# Patient Record
Sex: FEMALE | Race: WHITE | Employment: OTHER | URBAN - NONMETROPOLITAN AREA
[De-identification: names, ages, dates, MRNs, and addresses within clinical notes are randomized per-mention and may not be internally consistent; named-entity substitution may affect disease eponyms.]

---

## 2019-08-05 ENCOUNTER — OFFICE VISIT (OUTPATIENT)
Dept: PRIMARY CARE CLINIC | Age: 79
End: 2019-08-05
Payer: MEDICARE

## 2019-08-05 VITALS
TEMPERATURE: 98.1 F | WEIGHT: 114 LBS | HEART RATE: 51 BPM | DIASTOLIC BLOOD PRESSURE: 68 MMHG | SYSTOLIC BLOOD PRESSURE: 124 MMHG | RESPIRATION RATE: 16 BRPM | BODY MASS INDEX: 22.98 KG/M2 | HEIGHT: 59 IN | OXYGEN SATURATION: 98 %

## 2019-08-05 DIAGNOSIS — E11.65 TYPE 2 DIABETES MELLITUS WITH HYPERGLYCEMIA, WITHOUT LONG-TERM CURRENT USE OF INSULIN (HCC): ICD-10-CM

## 2019-08-05 DIAGNOSIS — I10 ESSENTIAL HYPERTENSION: Primary | ICD-10-CM

## 2019-08-05 DIAGNOSIS — F01.50 VASCULAR DEMENTIA WITHOUT BEHAVIORAL DISTURBANCE (HCC): ICD-10-CM

## 2019-08-05 DIAGNOSIS — E78.2 MIXED HYPERLIPIDEMIA: ICD-10-CM

## 2019-08-05 PROCEDURE — 99214 OFFICE O/P EST MOD 30 MIN: CPT | Performed by: INTERNAL MEDICINE

## 2019-08-05 RX ORDER — ENALAPRIL MALEATE 10 MG/1
2 TABLET ORAL NIGHTLY
Refills: 3 | COMMUNITY
Start: 2019-06-24 | End: 2019-08-05 | Stop reason: SDUPTHER

## 2019-08-05 RX ORDER — PITAVASTATIN CALCIUM 2.09 MG/1
1 TABLET, FILM COATED ORAL NIGHTLY
Qty: 90 TABLET | Refills: 3 | Status: SHIPPED | OUTPATIENT
Start: 2019-08-05 | End: 2019-08-08

## 2019-08-05 RX ORDER — ALLOPURINOL 100 MG/1
2 TABLET ORAL DAILY
Refills: 3 | COMMUNITY
Start: 2019-06-24 | End: 2020-02-04 | Stop reason: SDUPTHER

## 2019-08-05 RX ORDER — DONEPEZIL HYDROCHLORIDE 10 MG/1
10 TABLET, FILM COATED ORAL DAILY
Qty: 90 TABLET | Refills: 3 | Status: SHIPPED
Start: 2019-08-05 | End: 2020-06-04 | Stop reason: SDUPTHER

## 2019-08-05 RX ORDER — HYDROCHLOROTHIAZIDE 25 MG/1
25 TABLET ORAL DAILY
Qty: 90 TABLET | Refills: 3 | Status: SHIPPED
Start: 2019-08-05 | End: 2020-06-04 | Stop reason: SDUPTHER

## 2019-08-05 RX ORDER — DONEPEZIL HYDROCHLORIDE 10 MG/1
1 TABLET, FILM COATED ORAL DAILY
Refills: 3 | COMMUNITY
Start: 2019-05-28 | End: 2019-08-05 | Stop reason: SDUPTHER

## 2019-08-05 RX ORDER — METFORMIN HYDROCHLORIDE 500 MG/1
2 TABLET, EXTENDED RELEASE ORAL 2 TIMES DAILY
Refills: 3 | COMMUNITY
Start: 2019-05-15 | End: 2019-08-05 | Stop reason: SDUPTHER

## 2019-08-05 RX ORDER — METOPROLOL TARTRATE 50 MG/1
75 TABLET, FILM COATED ORAL 2 TIMES DAILY
Qty: 270 TABLET | Refills: 3 | Status: SHIPPED | OUTPATIENT
Start: 2019-08-05 | End: 2019-11-05 | Stop reason: SDUPTHER

## 2019-08-05 RX ORDER — METFORMIN HYDROCHLORIDE 500 MG/1
1000 TABLET, EXTENDED RELEASE ORAL 2 TIMES DAILY
Qty: 360 TABLET | Refills: 3 | Status: SHIPPED | OUTPATIENT
Start: 2019-08-05 | End: 2019-11-05

## 2019-08-05 RX ORDER — ENALAPRIL MALEATE 10 MG/1
20 TABLET ORAL NIGHTLY
Qty: 180 TABLET | Refills: 3 | Status: SHIPPED
Start: 2019-08-05 | End: 2020-05-04 | Stop reason: SDUPTHER

## 2019-08-05 RX ORDER — ACETAMINOPHEN 160 MG
TABLET,DISINTEGRATING ORAL DAILY
COMMUNITY

## 2019-08-05 RX ORDER — HYDROCHLOROTHIAZIDE 25 MG/1
1 TABLET ORAL DAILY
Refills: 3 | COMMUNITY
Start: 2019-05-15 | End: 2019-08-05 | Stop reason: SDUPTHER

## 2019-08-05 RX ORDER — METOPROLOL TARTRATE 50 MG/1
1.5 TABLET, FILM COATED ORAL 2 TIMES DAILY
Refills: 3 | COMMUNITY
Start: 2019-05-15 | End: 2019-08-05 | Stop reason: SDUPTHER

## 2019-08-05 RX ORDER — PITAVASTATIN CALCIUM 2.09 MG/1
1 TABLET, FILM COATED ORAL NIGHTLY
Refills: 3 | COMMUNITY
Start: 2019-05-15 | End: 2019-08-05 | Stop reason: SDUPTHER

## 2019-08-05 SDOH — HEALTH STABILITY: MENTAL HEALTH: HOW OFTEN DO YOU HAVE A DRINK CONTAINING ALCOHOL?: NEVER

## 2019-08-05 ASSESSMENT — ENCOUNTER SYMPTOMS
ANAL BLEEDING: 0
STRIDOR: 0
ABDOMINAL PAIN: 0
VOMITING: 0
EYE DISCHARGE: 0
COLOR CHANGE: 0
RHINORRHEA: 0
EYE PAIN: 0
WHEEZING: 0
COUGH: 0
PHOTOPHOBIA: 0
EYE ITCHING: 0
CONSTIPATION: 0
TROUBLE SWALLOWING: 0
NAUSEA: 0
SORE THROAT: 0
DIARRHEA: 0
BLOOD IN STOOL: 0
FACIAL SWELLING: 0
SHORTNESS OF BREATH: 0

## 2019-08-05 ASSESSMENT — PATIENT HEALTH QUESTIONNAIRE - PHQ9
SUM OF ALL RESPONSES TO PHQ9 QUESTIONS 1 & 2: 0
1. LITTLE INTEREST OR PLEASURE IN DOING THINGS: 0
2. FEELING DOWN, DEPRESSED OR HOPELESS: 0
SUM OF ALL RESPONSES TO PHQ QUESTIONS 1-9: 0
SUM OF ALL RESPONSES TO PHQ QUESTIONS 1-9: 0

## 2019-08-07 ENCOUNTER — HOSPITAL ENCOUNTER (OUTPATIENT)
Age: 79
Discharge: HOME | End: 2019-08-09
Payer: MEDICARE

## 2019-08-07 DIAGNOSIS — I10 ESSENTIAL HYPERTENSION: ICD-10-CM

## 2019-08-07 DIAGNOSIS — E78.2 MIXED HYPERLIPIDEMIA: ICD-10-CM

## 2019-08-07 DIAGNOSIS — E11.65 TYPE 2 DIABETES MELLITUS WITH HYPERGLYCEMIA, WITHOUT LONG-TERM CURRENT USE OF INSULIN (HCC): ICD-10-CM

## 2019-08-07 LAB
ALBUMIN SERPL-MCNC: 4.8 G/DL (ref 3.5–5.2)
ALP BLD-CCNC: 98 U/L (ref 35–104)
ALT SERPL-CCNC: 23 U/L (ref 0–32)
ANION GAP SERPL CALCULATED.3IONS-SCNC: 18 MMOL/L (ref 7–16)
AST SERPL-CCNC: 34 U/L (ref 0–31)
BILIRUB SERPL-MCNC: 0.3 MG/DL (ref 0–1.2)
BUN BLDV-MCNC: 34 MG/DL (ref 8–23)
CALCIUM SERPL-MCNC: 9.9 MG/DL (ref 8.6–10.2)
CHLORIDE BLD-SCNC: 102 MMOL/L (ref 98–107)
CHOLESTEROL, TOTAL: 157 MG/DL (ref 0–199)
CO2: 20 MMOL/L (ref 22–29)
CREAT SERPL-MCNC: 0.9 MG/DL (ref 0.5–1)
CREATININE URINE: 84 MG/DL (ref 29–226)
GFR AFRICAN AMERICAN: >60
GFR NON-AFRICAN AMERICAN: >60 ML/MIN/1.73
GLUCOSE BLD-MCNC: 223 MG/DL (ref 74–99)
HBA1C MFR BLD: 9 % (ref 4–5.6)
HDLC SERPL-MCNC: 40 MG/DL
LDL CHOLESTEROL CALCULATED: 67 MG/DL (ref 0–99)
MICROALBUMIN UR-MCNC: 63.7 MG/L
MICROALBUMIN/CREAT UR-RTO: 75.8 (ref 0–30)
POTASSIUM SERPL-SCNC: 4.9 MMOL/L (ref 3.5–5)
SODIUM BLD-SCNC: 140 MMOL/L (ref 132–146)
TOTAL PROTEIN: 7.6 G/DL (ref 6.4–8.3)
TRIGL SERPL-MCNC: 250 MG/DL (ref 0–149)
VLDLC SERPL CALC-MCNC: 50 MG/DL

## 2019-08-07 PROCEDURE — 80053 COMPREHEN METABOLIC PANEL: CPT

## 2019-08-07 PROCEDURE — 36415 COLL VENOUS BLD VENIPUNCTURE: CPT

## 2019-08-07 PROCEDURE — 83036 HEMOGLOBIN GLYCOSYLATED A1C: CPT

## 2019-08-07 PROCEDURE — 82570 ASSAY OF URINE CREATININE: CPT

## 2019-08-07 PROCEDURE — 80061 LIPID PANEL: CPT

## 2019-08-07 PROCEDURE — 82044 UR ALBUMIN SEMIQUANTITATIVE: CPT

## 2019-08-08 ENCOUNTER — TELEPHONE (OUTPATIENT)
Dept: PRIMARY CARE CLINIC | Age: 79
End: 2019-08-08

## 2019-08-08 DIAGNOSIS — E11.65 TYPE 2 DIABETES MELLITUS WITH HYPERGLYCEMIA, WITHOUT LONG-TERM CURRENT USE OF INSULIN (HCC): Primary | ICD-10-CM

## 2019-08-08 DIAGNOSIS — E78.2 MIXED HYPERLIPIDEMIA: Primary | ICD-10-CM

## 2019-08-08 RX ORDER — GLIMEPIRIDE 2 MG/1
TABLET ORAL
Qty: 60 TABLET | Refills: 5 | Status: SHIPPED | OUTPATIENT
Start: 2019-08-08 | End: 2019-11-05 | Stop reason: SDUPTHER

## 2019-08-08 RX ORDER — SIMVASTATIN 10 MG
10 TABLET ORAL NIGHTLY
Qty: 30 TABLET | Refills: 5 | Status: SHIPPED | OUTPATIENT
Start: 2019-08-08 | End: 2019-09-06 | Stop reason: SDUPTHER

## 2019-09-06 DIAGNOSIS — E78.2 MIXED HYPERLIPIDEMIA: ICD-10-CM

## 2019-09-06 RX ORDER — SIMVASTATIN 10 MG
10 TABLET ORAL NIGHTLY
Qty: 30 TABLET | Refills: 5 | Status: SHIPPED | OUTPATIENT
Start: 2019-09-06 | End: 2019-09-08 | Stop reason: SDUPTHER

## 2019-09-06 NOTE — TELEPHONE ENCOUNTER
Patient asking for a refill since pharmacy never received previous script.    Simvastatin sent to you

## 2019-09-08 RX ORDER — SIMVASTATIN 10 MG
10 TABLET ORAL NIGHTLY
Qty: 30 TABLET | Refills: 5 | Status: SHIPPED | OUTPATIENT
Start: 2019-09-08 | End: 2019-11-05 | Stop reason: SDUPTHER

## 2019-11-04 ENCOUNTER — TELEPHONE (OUTPATIENT)
Dept: FAMILY MEDICINE CLINIC | Age: 79
End: 2019-11-04

## 2019-11-05 ENCOUNTER — OFFICE VISIT (OUTPATIENT)
Dept: PRIMARY CARE CLINIC | Age: 79
End: 2019-11-05
Payer: MEDICARE

## 2019-11-05 VITALS
HEART RATE: 65 BPM | SYSTOLIC BLOOD PRESSURE: 132 MMHG | OXYGEN SATURATION: 98 % | HEIGHT: 59 IN | BODY MASS INDEX: 22.58 KG/M2 | WEIGHT: 112 LBS | RESPIRATION RATE: 16 BRPM | TEMPERATURE: 98 F | DIASTOLIC BLOOD PRESSURE: 82 MMHG

## 2019-11-05 DIAGNOSIS — E78.2 MIXED HYPERLIPIDEMIA: ICD-10-CM

## 2019-11-05 DIAGNOSIS — F01.50 VASCULAR DEMENTIA WITHOUT BEHAVIORAL DISTURBANCE (HCC): ICD-10-CM

## 2019-11-05 DIAGNOSIS — I10 ESSENTIAL HYPERTENSION: ICD-10-CM

## 2019-11-05 DIAGNOSIS — Z12.39 SCREENING FOR BREAST CANCER: ICD-10-CM

## 2019-11-05 DIAGNOSIS — E11.65 TYPE 2 DIABETES MELLITUS WITH HYPERGLYCEMIA, WITHOUT LONG-TERM CURRENT USE OF INSULIN (HCC): Primary | ICD-10-CM

## 2019-11-05 DIAGNOSIS — Z12.31 ENCOUNTER FOR SCREENING MAMMOGRAM FOR MALIGNANT NEOPLASM OF BREAST: ICD-10-CM

## 2019-11-05 LAB — HBA1C MFR BLD: 6.4 %

## 2019-11-05 PROCEDURE — 83036 HEMOGLOBIN GLYCOSYLATED A1C: CPT | Performed by: INTERNAL MEDICINE

## 2019-11-05 PROCEDURE — 1123F ACP DISCUSS/DSCN MKR DOCD: CPT | Performed by: INTERNAL MEDICINE

## 2019-11-05 PROCEDURE — 99213 OFFICE O/P EST LOW 20 MIN: CPT | Performed by: INTERNAL MEDICINE

## 2019-11-05 PROCEDURE — 1090F PRES/ABSN URINE INCON ASSESS: CPT | Performed by: INTERNAL MEDICINE

## 2019-11-05 PROCEDURE — G8427 DOCREV CUR MEDS BY ELIG CLIN: HCPCS | Performed by: INTERNAL MEDICINE

## 2019-11-05 PROCEDURE — G8420 CALC BMI NORM PARAMETERS: HCPCS | Performed by: INTERNAL MEDICINE

## 2019-11-05 PROCEDURE — 4040F PNEUMOC VAC/ADMIN/RCVD: CPT | Performed by: INTERNAL MEDICINE

## 2019-11-05 PROCEDURE — 1036F TOBACCO NON-USER: CPT | Performed by: INTERNAL MEDICINE

## 2019-11-05 PROCEDURE — G8399 PT W/DXA RESULTS DOCUMENT: HCPCS | Performed by: INTERNAL MEDICINE

## 2019-11-05 PROCEDURE — G8484 FLU IMMUNIZE NO ADMIN: HCPCS | Performed by: INTERNAL MEDICINE

## 2019-11-05 RX ORDER — METOPROLOL TARTRATE 50 MG/1
75 TABLET, FILM COATED ORAL 2 TIMES DAILY
Qty: 270 TABLET | Refills: 3 | Status: SHIPPED
Start: 2019-11-05 | End: 2020-06-04 | Stop reason: SDUPTHER

## 2019-11-05 RX ORDER — UBIDECARENONE 100 MG
100 CAPSULE ORAL DAILY
COMMUNITY

## 2019-11-05 RX ORDER — METFORMIN HYDROCHLORIDE 500 MG/1
1000 TABLET, FILM COATED, EXTENDED RELEASE ORAL
COMMUNITY
End: 2020-05-04 | Stop reason: SDUPTHER

## 2019-11-05 RX ORDER — SIMVASTATIN 10 MG
10 TABLET ORAL NIGHTLY
Qty: 90 TABLET | Refills: 3 | Status: SHIPPED | OUTPATIENT
Start: 2019-11-05 | End: 2020-02-04 | Stop reason: SDUPTHER

## 2019-11-05 RX ORDER — GLIMEPIRIDE 2 MG/1
TABLET ORAL
Qty: 180 TABLET | Refills: 3 | Status: SHIPPED | OUTPATIENT
Start: 2019-11-05 | End: 2020-02-04 | Stop reason: SDUPTHER

## 2019-11-05 ASSESSMENT — ENCOUNTER SYMPTOMS
EYE ITCHING: 0
EYE PAIN: 0
PHOTOPHOBIA: 0
SHORTNESS OF BREATH: 0
TROUBLE SWALLOWING: 0
COUGH: 0
COLOR CHANGE: 0
WHEEZING: 0
VOMITING: 0
EYE DISCHARGE: 0
ABDOMINAL PAIN: 0
ANAL BLEEDING: 0
BLOOD IN STOOL: 0
DIARRHEA: 0
NAUSEA: 0
RHINORRHEA: 0
STRIDOR: 0
CONSTIPATION: 0
SORE THROAT: 0
FACIAL SWELLING: 0

## 2019-11-21 DIAGNOSIS — Z12.31 ENCOUNTER FOR SCREENING MAMMOGRAM FOR MALIGNANT NEOPLASM OF BREAST: ICD-10-CM

## 2019-12-05 PROBLEM — Z12.31 ENCOUNTER FOR SCREENING MAMMOGRAM FOR MALIGNANT NEOPLASM OF BREAST: Status: RESOLVED | Noted: 2019-11-05 | Resolved: 2019-12-05

## 2020-01-30 ASSESSMENT — ENCOUNTER SYMPTOMS
COUGH: 0
NAUSEA: 0
SHORTNESS OF BREATH: 0
PHOTOPHOBIA: 0
DIARRHEA: 0
WHEEZING: 0
STRIDOR: 0
TROUBLE SWALLOWING: 0
COLOR CHANGE: 0
ABDOMINAL PAIN: 0
EYE ITCHING: 0
CONSTIPATION: 0
EYE DISCHARGE: 0
SORE THROAT: 0
ANAL BLEEDING: 0
BLOOD IN STOOL: 0
EYE PAIN: 0
VOMITING: 0
FACIAL SWELLING: 0
RHINORRHEA: 0

## 2020-01-30 NOTE — PROGRESS NOTES
2020    Name: Nadja Merino : 1940 Sex: female  Age: 67 y.o. Subjective:  Chief Complaint   Patient presents with    Diabetes    Hypertension          This 67y.o. -year-old female  presents today for evaluation and management of her  chronic medical problems. Current medication list reviewed. The patient is tolerating all medications well without adverse events or known side effects. The patient does understand the risk and benefits of the prescribed medications. Hypertension   Pertinent negatives include no chest pain, headaches, palpitations or shortness of breath. Hyperlipidemia   Pertinent negatives include no chest pain, myalgias or shortness of breath. Patient has a history of type 2 diabetes mellitus, hypertension, hyperlipidemia, mild dementia, gout and osteopenia. She sees Dr. Ethan Montano on a regular basis for treatment of her dementia. Recent bone density scan showed a T score of -1.5. She had a colonoscopy 2 years ago with Dr. Ovidio Andrew and was told she would have it every 5 years    . Mammogram done in 2019 was negative for malignancy. .      Eye examination was done in May 2019 by Dr. Martin Crews and she will be seeing him again. We will get results of her most recent diabetic eye examination. Last hemoglobin A1c has improved. It was 9 before ,now at 6.4%. Reviewed medications with patient and her . She takes metformin 500 mg 2 at suppertime. She takes glimepiride 2 mg before breakfast and supper. Her blood sugars have been low in the morning and I told her  not to give her glimepiride if her blood sugars less than 100 in the morning. He will continue with the evening medications for now. She is also on Vasotec and metoprolol for her blood pressure. She is on donepezil from Dr. Ethan Montano for her memory problems. Takes HydroDIURIL 25 mg daily and simvastatin 10 mg at bedtime. She takes allopurinol 100 mg daily.     She had her flu shot at [facility] this year. Review of Systems   Constitutional: Negative for appetite change, fatigue and unexpected weight change. HENT: Negative for congestion, ear pain, facial swelling, rhinorrhea, sore throat, tinnitus and trouble swallowing. Eyes: Negative for photophobia, pain, discharge, itching and visual disturbance. Respiratory: Negative for cough, shortness of breath, wheezing and stridor. Cardiovascular: Negative for chest pain, palpitations and leg swelling. Gastrointestinal: Negative for abdominal pain, anal bleeding, blood in stool, constipation, diarrhea, nausea and vomiting. Endocrine: Negative for cold intolerance, heat intolerance, polydipsia, polyphagia and polyuria. Genitourinary: Negative for difficulty urinating, dysuria, flank pain, frequency, hematuria and urgency. Musculoskeletal: Negative for arthralgias, gait problem, joint swelling and myalgias. Skin: Negative for color change, pallor and rash. Allergic/Immunologic: Negative for environmental allergies and food allergies. Neurological: Negative for dizziness, tremors, seizures, syncope, speech difficulty, weakness, light-headedness, numbness and headaches. Hematological: Negative for adenopathy. Does not bruise/bleed easily. Psychiatric/Behavioral: Positive for confusion. Negative for agitation, behavioral problems, sleep disturbance and suicidal ideas. The patient is not nervous/anxious.          Memory problems          Current Outpatient Medications:     simvastatin (ZOCOR) 10 MG tablet, Take 1 tablet by mouth nightly, Disp: 90 tablet, Rfl: 3    glimepiride (AMARYL) 2 MG tablet, Take 1 tablet before breakfast and supper, Disp: 180 tablet, Rfl: 3    allopurinol (ZYLOPRIM) 100 MG tablet, Take 2 tablets by mouth daily, Disp: 30 tablet, Rfl: 3    Tetanus-Diphth-Acell Pertussis (BOOSTRIX) 5-2.5-18.5 LF-MCG/0.5 injection, Inject 0.5 mLs into the muscle once for 1 dose, Disp: 1 each, Rfl: 0    coenzyme Q10 100 MG CAPS capsule, Take 100 mg by mouth daily, Disp: , Rfl:     metFORMIN, MOD, (GLUMETZA) 500 MG extended release tablet, Take 1,000 mg by mouth daily (with breakfast), Disp: , Rfl:     metoprolol tartrate (LOPRESSOR) 50 MG tablet, Take 1.5 tablets by mouth 2 times daily, Disp: 270 tablet, Rfl: 3    Cholecalciferol (VITAMIN D3) 50 MCG ( [place]) CAPS, Take by mouth daily , Disp: , Rfl:     hydrochlorothiazide (HYDRODIURIL) 25 MG tablet, Take 1 tablet by mouth daily, Disp: 90 tablet, Rfl: 3    donepezil (ARICEPT) 10 MG tablet, Take 1 tablet by mouth daily, Disp: 90 tablet, Rfl: 3    enalapril (VASOTEC) 10 MG tablet, Take 2 tablets by mouth nightly, Disp: 180 tablet, Rfl: 3     No Known Allergies     Past Medical History:   Diagnosis Date    Essential hypertension 2017    Mixed hyperlipidemia 2019    Type 2 diabetes mellitus with hyperglycemia, without long-term current use of insulin ([facility] 2019    Vascular dementia without behavioral disturbance ([facility] 2019       Health Maintenance Due   Topic Date Due    DTaP/Tdap/Td vaccine (1 - Tdap) 1952        Patient Active Problem List   Diagnosis    Essential hypertension    Type 2 diabetes mellitus with hyperglycemia, without long-term current use of insulin (HCC)    Mixed hyperlipidemia    Vascular dementia without behavioral disturbance (HCC)    Chronic gout without tophus        Past Surgical History:   Procedure Laterality Date     SECTION      DILATION AND CURETTAGE OF UTERUS      TONSILLECTOMY          Family History   Problem Relation Age of Onset    Heart Attack Father         Social History     Tobacco Use    Smoking status: Never Smoker    Smokeless tobacco: Never Used   Substance Use Topics    Alcohol use: Not Currently     Frequency: Never    Drug use: Never        Objective  Vitals:    20 0923   BP: 136/62   Site: Right Upper Arm   Position: Sitting   Cuff Size: Medium Adult   Pulse: 92   Temp: reviewed. ASSESSMENT & PLAN :   Problem List        Circulatory    Essential hypertension - Primary     Blood pressures are stable. Continue medications and monitor blood pressures at home. Call office if systolics are over 306 over diastolics over 90. Check fasting CMP         Relevant Medications    hydrochlorothiazide (HYDRODIURIL) 25 MG tablet    enalapril (VASOTEC) 10 MG tablet    metoprolol tartrate (LOPRESSOR) 50 MG tablet    simvastatin (ZOCOR) 10 MG tablet    Other Relevant Orders    Comprehensive Metabolic Panel       Endocrine    Type 2 diabetes mellitus with hyperglycemia, without long-term current use of insulin ([address]     Watch her diet. Monitor her blood sugars as directed. Let me know if her blood sugars are consistently elevated over 120 fasting. Check hemoglobin A1c and urine microalbumin/creatinine ratio         Relevant Medications    metFORMIN, MOD, (GLUMETZA) 500 MG extended release tablet    glimepiride (AMARYL) 2 MG tablet    Other Relevant Orders    Comprehensive Metabolic Panel    Hemoglobin A1C    Microalbumin / Creatinine Urine Ratio       Nervous and Auditory    Vascular dementia without behavioral disturbance (HCC)     Continue donepezil and follow-up with Dr. Alyssa Can         Relevant Medications    donepezil (ARICEPT) 10 MG tablet       Other    Mixed hyperlipidemia     Watch saturated fats in diet and will monitor lipids         Relevant Medications    hydrochlorothiazide (HYDRODIURIL) 25 MG tablet    enalapril (VASOTEC) 10 MG tablet    metoprolol tartrate (LOPRESSOR) 50 MG tablet    simvastatin (ZOCOR) 10 MG tablet    Other Relevant Orders    Lipid Panel    Comprehensive Metabolic Panel    Chronic gout without tophus     Continue allopurinol and check uric acid         Relevant Medications    allopurinol (ZYLOPRIM) 100 MG tablet           Return in about 3 months (around 5/4/2020) for AWV and regular office visit.        Fernando Eisenberg DO  2/4/2020

## 2020-02-04 ENCOUNTER — TELEPHONE (OUTPATIENT)
Dept: PRIMARY CARE CLINIC | Age: 80
End: 2020-02-04

## 2020-02-04 ENCOUNTER — OFFICE VISIT (OUTPATIENT)
Dept: PRIMARY CARE CLINIC | Age: 80
End: 2020-02-04
Payer: MEDICARE

## 2020-02-04 ENCOUNTER — HOSPITAL ENCOUNTER (OUTPATIENT)
Age: 80
Discharge: HOME | End: 2020-02-06
Payer: MEDICARE

## 2020-02-04 VITALS
WEIGHT: 115 LBS | TEMPERATURE: 97.4 F | DIASTOLIC BLOOD PRESSURE: 62 MMHG | HEIGHT: 58 IN | OXYGEN SATURATION: 93 % | HEART RATE: 92 BPM | BODY MASS INDEX: 24.14 KG/M2 | SYSTOLIC BLOOD PRESSURE: 136 MMHG

## 2020-02-04 PROBLEM — M1A.9XX0 CHRONIC GOUT WITHOUT TOPHUS: Status: ACTIVE | Noted: 2020-02-04

## 2020-02-04 LAB
ALBUMIN SERPL-MCNC: 4.6 G/DL (ref 3.5–5.2)
ALP BLD-CCNC: 95 U/L (ref 35–104)
ALT SERPL-CCNC: 24 U/L (ref 0–32)
ANION GAP SERPL CALCULATED.3IONS-SCNC: 16 MMOL/L (ref 7–16)
AST SERPL-CCNC: 37 U/L (ref 0–31)
BILIRUB SERPL-MCNC: <0.2 MG/DL (ref 0–1.2)
BUN BLDV-MCNC: 32 MG/DL (ref 8–23)
CALCIUM SERPL-MCNC: 10 MG/DL (ref 8.6–10.2)
CHLORIDE BLD-SCNC: 107 MMOL/L (ref 98–107)
CHOLESTEROL, TOTAL: 176 MG/DL (ref 0–199)
CO2: 17 MMOL/L (ref 22–29)
CREAT SERPL-MCNC: 0.9 MG/DL (ref 0.5–1)
CREATININE URINE: 65 MG/DL (ref 29–226)
GFR AFRICAN AMERICAN: >60
GFR NON-AFRICAN AMERICAN: >60 ML/MIN/1.73
GLUCOSE BLD-MCNC: 113 MG/DL (ref 74–99)
HBA1C MFR BLD: 6.7 % (ref 4–5.6)
HDLC SERPL-MCNC: 44 MG/DL
LDL CHOLESTEROL CALCULATED: 84 MG/DL (ref 0–99)
MICROALBUMIN UR-MCNC: 42.2 MG/L
MICROALBUMIN/CREAT UR-RTO: 64.9 (ref 0–30)
POTASSIUM SERPL-SCNC: 5 MMOL/L (ref 3.5–5)
SODIUM BLD-SCNC: 140 MMOL/L (ref 132–146)
TOTAL PROTEIN: 8 G/DL (ref 6.4–8.3)
TRIGL SERPL-MCNC: 239 MG/DL (ref 0–149)
VLDLC SERPL CALC-MCNC: 48 MG/DL

## 2020-02-04 PROCEDURE — G8420 CALC BMI NORM PARAMETERS: HCPCS | Performed by: INTERNAL MEDICINE

## 2020-02-04 PROCEDURE — G8399 PT W/DXA RESULTS DOCUMENT: HCPCS | Performed by: INTERNAL MEDICINE

## 2020-02-04 PROCEDURE — 99214 OFFICE O/P EST MOD 30 MIN: CPT | Performed by: INTERNAL MEDICINE

## 2020-02-04 PROCEDURE — 1090F PRES/ABSN URINE INCON ASSESS: CPT | Performed by: INTERNAL MEDICINE

## 2020-02-04 PROCEDURE — 83036 HEMOGLOBIN GLYCOSYLATED A1C: CPT

## 2020-02-04 PROCEDURE — G8427 DOCREV CUR MEDS BY ELIG CLIN: HCPCS | Performed by: INTERNAL MEDICINE

## 2020-02-04 PROCEDURE — 84550 ASSAY OF BLOOD/URIC ACID: CPT

## 2020-02-04 PROCEDURE — 1123F ACP DISCUSS/DSCN MKR DOCD: CPT | Performed by: INTERNAL MEDICINE

## 2020-02-04 PROCEDURE — 82570 ASSAY OF URINE CREATININE: CPT

## 2020-02-04 PROCEDURE — 36415 COLL VENOUS BLD VENIPUNCTURE: CPT

## 2020-02-04 PROCEDURE — 80053 COMPREHEN METABOLIC PANEL: CPT

## 2020-02-04 PROCEDURE — 82044 UR ALBUMIN SEMIQUANTITATIVE: CPT

## 2020-02-04 PROCEDURE — 1036F TOBACCO NON-USER: CPT | Performed by: INTERNAL MEDICINE

## 2020-02-04 PROCEDURE — 4040F PNEUMOC VAC/ADMIN/RCVD: CPT | Performed by: INTERNAL MEDICINE

## 2020-02-04 PROCEDURE — 80061 LIPID PANEL: CPT

## 2020-02-04 PROCEDURE — G8482 FLU IMMUNIZE ORDER/ADMIN: HCPCS | Performed by: INTERNAL MEDICINE

## 2020-02-04 RX ORDER — GLIMEPIRIDE 2 MG/1
TABLET ORAL
Qty: 180 TABLET | Refills: 3 | Status: SHIPPED
Start: 2020-02-04 | End: 2020-06-16

## 2020-02-04 RX ORDER — SIMVASTATIN 10 MG
10 TABLET ORAL NIGHTLY
Qty: 90 TABLET | Refills: 3 | Status: SHIPPED | OUTPATIENT
Start: 2020-02-04

## 2020-02-04 RX ORDER — ALLOPURINOL 100 MG/1
200 TABLET ORAL DAILY
Qty: 30 TABLET | Refills: 3 | Status: SHIPPED
Start: 2020-02-04 | End: 2020-05-04 | Stop reason: SDUPTHER

## 2020-02-04 ASSESSMENT — PATIENT HEALTH QUESTIONNAIRE - PHQ9
2. FEELING DOWN, DEPRESSED OR HOPELESS: 0
SUM OF ALL RESPONSES TO PHQ QUESTIONS 1-9: 0
SUM OF ALL RESPONSES TO PHQ QUESTIONS 1-9: 0
SUM OF ALL RESPONSES TO PHQ9 QUESTIONS 1 & 2: 0
1. LITTLE INTEREST OR PLEASURE IN DOING THINGS: 0

## 2020-02-05 LAB — URIC ACID, SERUM: 4.1 MG/DL (ref 2.4–5.7)

## 2020-05-04 ENCOUNTER — TELEPHONE (OUTPATIENT)
Dept: PRIMARY CARE CLINIC | Age: 80
End: 2020-05-04

## 2020-05-04 RX ORDER — ENALAPRIL MALEATE 10 MG/1
20 TABLET ORAL NIGHTLY
Qty: 180 TABLET | Refills: 3 | Status: SHIPPED
Start: 2020-05-04 | End: 2020-06-16

## 2020-05-04 RX ORDER — METFORMIN HYDROCHLORIDE 500 MG/1
1000 TABLET, FILM COATED, EXTENDED RELEASE ORAL
Qty: 30 TABLET | Refills: 3 | Status: SHIPPED
Start: 2020-05-04 | End: 2020-05-05

## 2020-05-04 RX ORDER — ALLOPURINOL 100 MG/1
200 TABLET ORAL DAILY
Qty: 30 TABLET | Refills: 3 | Status: SHIPPED
Start: 2020-05-04 | End: 2020-06-16

## 2020-05-04 NOTE — TELEPHONE ENCOUNTER
Alicia from [facility] called regarding script for Metformin send over today. Order is for Metformin (Glumetza) 500 mg extended release tabs. Also order is for 2 tabs PO with breakfast.  Extended release is not usually ordered like this. Please advise.     Electronically signed by Janak Griffith LPN on 1/8/4648 at 3:87 PM

## 2020-05-04 NOTE — TELEPHONE ENCOUNTER
Patient needs pended med refilled. Patient's  called for refills and to cancel 05/06/2020 appointment for both him and Ksenia. They are concerned to leave home due to virus. Rescheduled for June 22.       Electronically signed by Davon Josue LPN on 2/9/2200 at 26:33 AM

## 2020-05-05 RX ORDER — METFORMIN HYDROCHLORIDE 500 MG/1
1000 TABLET, FILM COATED, EXTENDED RELEASE ORAL
COMMUNITY
End: 2020-05-05 | Stop reason: CLARIF

## 2020-05-05 RX ORDER — METFORMIN HYDROCHLORIDE 500 MG/1
1000 TABLET, FILM COATED, EXTENDED RELEASE ORAL
Qty: 60 TABLET | Refills: 2 | Status: SHIPPED
Start: 2020-05-05 | End: 2020-05-05

## 2020-05-05 RX ORDER — METFORMIN HYDROCHLORIDE 500 MG/1
1000 TABLET, FILM COATED, EXTENDED RELEASE ORAL
COMMUNITY
End: 2020-05-05 | Stop reason: SDUPTHER

## 2020-05-05 RX ORDER — METFORMIN HYDROCHLORIDE 500 MG/1
TABLET, EXTENDED RELEASE ORAL
Qty: 180 TABLET | Refills: 1 | Status: SHIPPED
Start: 2020-05-05 | End: 2020-06-16

## 2020-05-05 NOTE — TELEPHONE ENCOUNTER
Pharmacy called stating metformin ([employer]) is not covered but metformin (glucophage) is.  Pended new order for you

## 2020-05-05 NOTE — TELEPHONE ENCOUNTER
I spoke with , Zhao Fontaine, he does not want to make changes to patient's dosage. He wants to continue to give patient two of the 500 mg tabs daily.   I pended order to go over to [facility] per patient's 's request.      Electronically signed by Oh Olmstead LPN on 8/9/8519 at 33:00 PM

## 2020-06-03 ENCOUNTER — TELEPHONE (OUTPATIENT)
Dept: PRIMARY CARE CLINIC | Age: 80
End: 2020-06-03

## 2020-06-04 ENCOUNTER — VIRTUAL VISIT (OUTPATIENT)
Dept: PRIMARY CARE CLINIC | Age: 80
End: 2020-06-04
Payer: MEDICARE

## 2020-06-04 VITALS — HEIGHT: 59 IN | WEIGHT: 115 LBS | BODY MASS INDEX: 23.18 KG/M2

## 2020-06-04 PROBLEM — E55.9 VITAMIN D DEFICIENCY: Status: ACTIVE | Noted: 2020-06-04

## 2020-06-04 PROBLEM — R53.83 OTHER FATIGUE: Status: ACTIVE | Noted: 2020-06-04

## 2020-06-04 PROBLEM — R05.9 COUGH: Status: ACTIVE | Noted: 2020-06-04

## 2020-06-04 PROBLEM — R63.0 LOSS OF APPETITE: Status: ACTIVE | Noted: 2020-06-04

## 2020-06-04 PROCEDURE — 99213 OFFICE O/P EST LOW 20 MIN: CPT | Performed by: INTERNAL MEDICINE

## 2020-06-04 RX ORDER — DONEPEZIL HYDROCHLORIDE 10 MG/1
10 TABLET, FILM COATED ORAL DAILY
Qty: 90 TABLET | Refills: 3 | Status: SHIPPED
Start: 2020-06-04 | End: 2020-06-16

## 2020-06-04 RX ORDER — METOPROLOL TARTRATE 50 MG/1
75 TABLET, FILM COATED ORAL 2 TIMES DAILY
Qty: 270 TABLET | Refills: 3 | Status: SHIPPED | OUTPATIENT
Start: 2020-06-04

## 2020-06-04 RX ORDER — MIRTAZAPINE 7.5 MG/1
7.5 TABLET, FILM COATED ORAL NIGHTLY
Qty: 30 TABLET | Refills: 5 | Status: SHIPPED
Start: 2020-06-04 | End: 2020-08-25 | Stop reason: ALTCHOICE

## 2020-06-04 RX ORDER — HYDROCHLOROTHIAZIDE 25 MG/1
25 TABLET ORAL DAILY
Qty: 90 TABLET | Refills: 3 | Status: SHIPPED
Start: 2020-06-04 | End: 2020-06-16

## 2020-06-04 ASSESSMENT — ENCOUNTER SYMPTOMS
SHORTNESS OF BREATH: 0
DIARRHEA: 0
EYE PAIN: 0
CONSTIPATION: 0
ANAL BLEEDING: 0
FACIAL SWELLING: 0
WHEEZING: 0
PHOTOPHOBIA: 0
BLOOD IN STOOL: 0
COLOR CHANGE: 0
COUGH: 1
ABDOMINAL PAIN: 0
EYE ITCHING: 0
VOMITING: 0
SORE THROAT: 0
NAUSEA: 0
TROUBLE SWALLOWING: 0
EYE DISCHARGE: 0
STRIDOR: 0
RHINORRHEA: 0

## 2020-06-04 NOTE — PROGRESS NOTES
[facility]) 2019       Health Maintenance Due   Topic Date Due    DTaP/Tdap/Td vaccine (1 - Tdap) 1960    Annual Wellness Visit (AWV)  2019        Patient Active Problem List   Diagnosis    Essential hypertension    Type 2 diabetes mellitus with hyperglycemia, without long-term current use of insulin (HCC)    Mixed hyperlipidemia    Vascular dementia without behavioral disturbance (HCC)    Chronic gout without tophus    Cough    Loss of appetite    Other fatigue    Vitamin D deficiency        Past Surgical History:   Procedure Laterality Date     SECTION      DILATION AND CURETTAGE OF UTERUS      TONSILLECTOMY          Family History   Problem Relation Age of Onset    Heart Attack Father         Social History     Tobacco Use    Smoking status: Never Smoker    Smokeless tobacco: Never Used   Substance Use Topics    Alcohol use: Not Currently     Frequency: Never    Drug use: Never        Objective  Vitals:    20 0914   Weight: 115 lb (52.2 kg)   Height: 4' 10.5\" (1.486 m)        Exam:  Physical Exam  Vitals signs reviewed. Constitutional:       Appearance: She is well-developed. HENT:      Head: Normocephalic. Right Ear: External ear normal.      Left Ear: External ear normal.      Nose: Nose normal.      Mouth/Throat:      Pharynx: No oropharyngeal exudate. Eyes:      General: No scleral icterus. Right eye: No discharge. Left eye: No discharge. Conjunctiva/sclera: Conjunctivae normal.      Pupils: Pupils are equal, round, and reactive to light. Neck:      Musculoskeletal: Normal range of motion and neck supple. Thyroid: No thyromegaly. Cardiovascular:      Rate and Rhythm: Normal rate and regular rhythm. Heart sounds: Normal heart sounds. No murmur. No friction rub. No gallop. Pulmonary:      Effort: Pulmonary effort is normal. No respiratory distress. Breath sounds: Normal breath sounds. No wheezing or rales.    Chest: Chest wall: No tenderness. Abdominal:      General: Bowel sounds are normal. There is no distension. Palpations: Abdomen is soft. There is no mass. Tenderness: There is no abdominal tenderness. There is no guarding or rebound. Musculoskeletal: Normal range of motion. General: No tenderness or deformity. Lymphadenopathy:      Cervical: No cervical adenopathy. Skin:     General: Skin is warm and dry. Coloration: Skin is not pale. Findings: No erythema or rash. Neurological:      Mental Status: She is alert and oriented to person, place, and time. Cranial Nerves: No cranial nerve deficit. Sensory: No sensory deficit. Deep Tendon Reflexes: Reflexes normal.   Psychiatric:         Behavior: Behavior normal.         Thought Content: Thought content normal.         Judgment: Judgment normal.      Comments: Mild impairment of recent memory          Last labs reviewed. ASSESSMENT & PLAN :   Problem List        Circulatory    Essential hypertension     Blood pressures are stable. Continue medications and monitor blood pressures at home. Call office if systolics are over 700 over diastolics over 90. Relevant Medications    enalapril (VASOTEC) 10 MG tablet    metoprolol tartrate (LOPRESSOR) 50 MG tablet    hydroCHLOROthiazide (HYDRODIURIL) 25 MG tablet    Other Relevant Orders    Comprehensive Metabolic Panel       Endocrine    Type 2 diabetes mellitus with hyperglycemia, without long-term current use of insulin ([address]     Watch her diet. Monitor her blood sugars as directed. Let me know if her blood sugars are consistently elevated over 120 fasting.          Relevant Medications    glimepiride (AMARYL) 2 MG tablet    metFORMIN (GLUCOPHAGE-XR) 500 MG extended release tablet    Other Relevant Orders    Hemoglobin A1C       Nervous and Auditory    Vascular dementia without behavioral disturbance (HCC)     Continue donepezil         Relevant Medications

## 2020-06-05 ENCOUNTER — HOSPITAL ENCOUNTER (OUTPATIENT)
Age: 80
Discharge: HOME | End: 2020-06-07
Payer: MEDICARE

## 2020-06-05 LAB
ALBUMIN SERPL-MCNC: 4.1 G/DL (ref 3.5–5.2)
ALP BLD-CCNC: 134 U/L (ref 35–104)
ALT SERPL-CCNC: 10 U/L (ref 0–32)
ANION GAP SERPL CALCULATED.3IONS-SCNC: 34 MMOL/L (ref 7–16)
AST SERPL-CCNC: 22 U/L (ref 0–31)
BILIRUB SERPL-MCNC: <0.2 MG/DL (ref 0–1.2)
BUN BLDV-MCNC: 143 MG/DL (ref 8–23)
CALCIUM SERPL-MCNC: 9.4 MG/DL (ref 8.6–10.2)
CHLORIDE BLD-SCNC: 97 MMOL/L (ref 98–107)
CO2: 6 MMOL/L (ref 22–29)
CREAT SERPL-MCNC: 9.5 MG/DL (ref 0.5–1)
FOLATE: 9.8 NG/ML (ref 4.8–24.2)
GFR AFRICAN AMERICAN: 5
GFR NON-AFRICAN AMERICAN: 4 ML/MIN/1.73
GLUCOSE BLD-MCNC: 125 MG/DL (ref 74–99)
HBA1C MFR BLD: 6.8 % (ref 4–5.6)
POTASSIUM SERPL-SCNC: 6.4 MMOL/L (ref 3.5–5)
SODIUM BLD-SCNC: 137 MMOL/L (ref 132–146)
T4 FREE: 1.06 NG/DL (ref 0.93–1.7)
TOTAL PROTEIN: 7.8 G/DL (ref 6.4–8.3)
TSH SERPL DL<=0.05 MIU/L-ACNC: 1.93 UIU/ML (ref 0.27–4.2)
URIC ACID, SERUM: 3.7 MG/DL (ref 2.4–5.7)
VITAMIN B-12: 703 PG/ML (ref 211–946)
VITAMIN D 25-HYDROXY: 58 NG/ML (ref 30–100)

## 2020-06-05 PROCEDURE — 80053 COMPREHEN METABOLIC PANEL: CPT

## 2020-06-05 PROCEDURE — 84550 ASSAY OF BLOOD/URIC ACID: CPT

## 2020-06-05 PROCEDURE — 85025 COMPLETE CBC W/AUTO DIFF WBC: CPT

## 2020-06-05 PROCEDURE — 82607 VITAMIN B-12: CPT

## 2020-06-05 PROCEDURE — 36415 COLL VENOUS BLD VENIPUNCTURE: CPT

## 2020-06-05 PROCEDURE — 84443 ASSAY THYROID STIM HORMONE: CPT

## 2020-06-05 PROCEDURE — 83036 HEMOGLOBIN GLYCOSYLATED A1C: CPT

## 2020-06-05 PROCEDURE — 82306 VITAMIN D 25 HYDROXY: CPT

## 2020-06-05 PROCEDURE — 82746 ASSAY OF FOLIC ACID SERUM: CPT

## 2020-06-05 PROCEDURE — 84439 ASSAY OF FREE THYROXINE: CPT

## 2020-06-06 LAB
BASOPHILS ABSOLUTE: 0.05 E9/L (ref 0–0.2)
BASOPHILS RELATIVE PERCENT: 0.4 % (ref 0–2)
EOSINOPHILS ABSOLUTE: 0.03 E9/L (ref 0.05–0.5)
EOSINOPHILS RELATIVE PERCENT: 0.2 % (ref 0–6)
HCT VFR BLD CALC: 36.8 % (ref 34–48)
HEMOGLOBIN: 11.2 G/DL (ref 11.5–15.5)
IMMATURE GRANULOCYTES #: 0.07 E9/L
IMMATURE GRANULOCYTES %: 0.5 % (ref 0–5)
LYMPHOCYTES ABSOLUTE: 1.74 E9/L (ref 1.5–4)
LYMPHOCYTES RELATIVE PERCENT: 13.6 % (ref 20–42)
MCH RBC QN AUTO: 32.6 PG (ref 26–35)
MCHC RBC AUTO-ENTMCNC: 30.4 % (ref 32–34.5)
MCV RBC AUTO: 107 FL (ref 80–99.9)
MONOCYTES ABSOLUTE: 0.71 E9/L (ref 0.1–0.95)
MONOCYTES RELATIVE PERCENT: 5.6 % (ref 2–12)
NEUTROPHILS ABSOLUTE: 10.19 E9/L (ref 1.8–7.3)
NEUTROPHILS RELATIVE PERCENT: 79.7 % (ref 43–80)
PDW BLD-RTO: 15.9 FL (ref 11.5–15)
PLATELET # BLD: 463 E9/L (ref 130–450)
PMV BLD AUTO: 10.1 FL (ref 7–12)
RBC # BLD: 3.44 E12/L (ref 3.5–5.5)
WBC # BLD: 12.8 E9/L (ref 4.5–11.5)

## 2020-06-08 ENCOUNTER — TELEPHONE (OUTPATIENT)
Dept: PRIMARY CARE CLINIC | Age: 80
End: 2020-06-08

## 2020-06-11 ENCOUNTER — TELEPHONE (OUTPATIENT)
Dept: PRIMARY CARE CLINIC | Age: 80
End: 2020-06-11

## 2020-06-11 NOTE — TELEPHONE ENCOUNTER
Left messge with Selwyn Lund, the supervisor, that you will sign for orders.  They are going to see that she is set up for appt in 2 weeks

## 2020-06-15 LAB
A/G RATIO: 1.4 RATIO (ref 1.1–2.2)
ALBUMIN SERPL-MCNC: 3.6 G/DL (ref 3.4–4.8)
ALP BLD-CCNC: 102 U/L (ref 42–121)
ALT SERPL-CCNC: 34 U/L (ref 10–54)
ANION GAP SERPL CALCULATED.3IONS-SCNC: 7 MEQ/L (ref 3–11)
AST SERPL-CCNC: 40 U/L (ref 10–41)
BASOPHILS ABSOLUTE: 0.1 K/UL (ref 0–0.2)
BASOPHILS RELATIVE PERCENT: 0.6 % (ref 0–1.5)
BILIRUB SERPL-MCNC: 0.5 MG/DL (ref 0.3–1.5)
BUN BLDV-MCNC: 31 MG/DL (ref 8–21)
CALCIUM SERPL-MCNC: 8.4 MG/DL (ref 8.5–10.5)
CHLORIDE BLD-SCNC: 109 MEQ/L (ref 98–107)
CO2: 23 MEQ/L (ref 21–31)
CREAT SERPL-MCNC: 1.3 MG/DL (ref 0.4–1)
CREATININE + EGFR PANEL: 48 ML/MIN
DIFFERENTIAL, MANUAL: NO
EOSINOPHILS ABSOLUTE: 0.2 K/UL (ref 0–0.33)
EOSINOPHILS RELATIVE PERCENT: 1.9 % (ref 0–3)
GFR NON-AFRICAN AMERICAN: 40 ML/MIN
GLOBULIN: 2.6 G/DL (ref 1.9–3.9)
GLUCOSE BLD-MCNC: 145 MG/DL (ref 70–99)
HCT VFR BLD CALC: 28 % (ref 36–44)
HEMOGLOBIN: 9.3 G/DL (ref 12–15)
LYMPHOCYTES ABSOLUTE: 1.7 K/UL (ref 1.1–4.8)
LYMPHOCYTES RELATIVE PERCENT: 17.9 % (ref 24–44)
MCH RBC QN AUTO: 33.8 PG (ref 28–34)
MCHC RBC AUTO-ENTMCNC: 33.1 G/DL (ref 33–37)
MCV RBC AUTO: 102.1 FL (ref 80–100)
MONOCYTES ABSOLUTE: 0.5 K/UL (ref 0.2–0.7)
MONOCYTES RELATIVE PERCENT: 5.4 % (ref 3.4–9)
NEUTROPHILS ABSOLUTE: 7 K/UL (ref 1.83–8.7)
PDW BLD-RTO: 15.5 % (ref 10.9–14.3)
PLATELET # BLD: 275 K/UL (ref 150–450)
PMV BLD AUTO: 8.6 FL (ref 7.4–10.4)
POTASSIUM SERPL-SCNC: 4.4 MEQ/L (ref 3.6–5)
RBC # BLD: 2.74 M/UL (ref 4–4.9)
SEGMENTED NEUTROPHILS RELATIVE PERCENT: 74.2 % (ref 40–74)
SODIUM BLD-SCNC: 139 MEQ/L (ref 135–145)
TOTAL PROTEIN: 6.2 G/DL (ref 5.9–7.8)
WBC # BLD: 9.5 K/UL (ref 4.5–11)

## 2020-06-16 ENCOUNTER — OFFICE VISIT (OUTPATIENT)
Dept: PRIMARY CARE CLINIC | Age: 80
End: 2020-06-16
Payer: MEDICARE

## 2020-06-16 VITALS
SYSTOLIC BLOOD PRESSURE: 124 MMHG | HEART RATE: 66 BPM | WEIGHT: 104 LBS | TEMPERATURE: 97.2 F | DIASTOLIC BLOOD PRESSURE: 70 MMHG | BODY MASS INDEX: 20.96 KG/M2 | OXYGEN SATURATION: 95 % | HEIGHT: 59 IN

## 2020-06-16 PROBLEM — D50.9 IRON DEFICIENCY ANEMIA: Status: ACTIVE | Noted: 2020-06-16

## 2020-06-16 PROBLEM — N18.30 STAGE 3 CHRONIC KIDNEY DISEASE (HCC): Status: ACTIVE | Noted: 2020-06-16

## 2020-06-16 PROBLEM — R63.0 LOSS OF APPETITE: Status: RESOLVED | Noted: 2020-06-04 | Resolved: 2020-06-16

## 2020-06-16 PROBLEM — R53.83 OTHER FATIGUE: Status: RESOLVED | Noted: 2020-06-04 | Resolved: 2020-06-16

## 2020-06-16 PROBLEM — E55.9 VITAMIN D DEFICIENCY: Status: RESOLVED | Noted: 2020-06-04 | Resolved: 2020-06-16

## 2020-06-16 PROCEDURE — G8420 CALC BMI NORM PARAMETERS: HCPCS | Performed by: INTERNAL MEDICINE

## 2020-06-16 PROCEDURE — 99214 OFFICE O/P EST MOD 30 MIN: CPT | Performed by: INTERNAL MEDICINE

## 2020-06-16 PROCEDURE — G8399 PT W/DXA RESULTS DOCUMENT: HCPCS | Performed by: INTERNAL MEDICINE

## 2020-06-16 PROCEDURE — 1123F ACP DISCUSS/DSCN MKR DOCD: CPT | Performed by: INTERNAL MEDICINE

## 2020-06-16 PROCEDURE — 4040F PNEUMOC VAC/ADMIN/RCVD: CPT | Performed by: INTERNAL MEDICINE

## 2020-06-16 PROCEDURE — G8427 DOCREV CUR MEDS BY ELIG CLIN: HCPCS | Performed by: INTERNAL MEDICINE

## 2020-06-16 PROCEDURE — 1090F PRES/ABSN URINE INCON ASSESS: CPT | Performed by: INTERNAL MEDICINE

## 2020-06-16 PROCEDURE — 1036F TOBACCO NON-USER: CPT | Performed by: INTERNAL MEDICINE

## 2020-06-16 RX ORDER — NICOTINE 14MG/24HR
PATCH, TRANSDERMAL 24 HOURS TRANSDERMAL
COMMUNITY
Start: 2020-06-11 | End: 2020-08-25 | Stop reason: ALTCHOICE

## 2020-06-16 ASSESSMENT — ENCOUNTER SYMPTOMS
ABDOMINAL PAIN: 0
DIARRHEA: 0
RHINORRHEA: 0
PHOTOPHOBIA: 0
EYE DISCHARGE: 0
SORE THROAT: 0
STRIDOR: 0
TROUBLE SWALLOWING: 0
WHEEZING: 0
COUGH: 0
NAUSEA: 0
EYE PAIN: 0
COLOR CHANGE: 0
BLOOD IN STOOL: 0
VOMITING: 0
SHORTNESS OF BREATH: 0
EYE ITCHING: 0
ANAL BLEEDING: 0
CONSTIPATION: 0
FACIAL SWELLING: 0

## 2020-06-16 NOTE — PROGRESS NOTES
previous CKD 3, hyperlipidemia, chronic gout, vitamin D deficiency    Since discharge her appetite is improved she is much more alert and her  says she is back to baseline. Review of Systems   Constitutional: Negative for appetite change, fatigue and unexpected weight change. HENT: Negative for congestion, ear pain, facial swelling, rhinorrhea, sore throat, tinnitus and trouble swallowing. Eyes: Negative for photophobia, pain, discharge, itching and visual disturbance. Respiratory: Negative for cough, shortness of breath, wheezing and stridor. Cardiovascular: Negative for chest pain, palpitations and leg swelling. Gastrointestinal: Negative for abdominal pain, anal bleeding, blood in stool, constipation, diarrhea, nausea and vomiting. Endocrine: Negative for cold intolerance, heat intolerance, polydipsia, polyphagia and polyuria. Genitourinary: Negative for difficulty urinating, dysuria, flank pain, frequency, hematuria and urgency. Musculoskeletal: Negative for arthralgias, gait problem, joint swelling and myalgias. Skin: Negative for color change, pallor and rash. Allergic/Immunologic: Negative for environmental allergies and food allergies. Neurological: Negative for dizziness, tremors, seizures, syncope, speech difficulty, weakness, light-headedness, numbness and headaches. Hematological: Negative for adenopathy. Does not bruise/bleed easily. Psychiatric/Behavioral: Negative for agitation, behavioral problems, confusion, sleep disturbance and suicidal ideas. The patient is not nervous/anxious.          Occasionally memory problems          Current Outpatient Medications:     metoprolol tartrate (LOPRESSOR) 50 MG tablet, Take 1.5 tablets by mouth 2 times daily, Disp: 270 tablet, Rfl: 3    mirtazapine (REMERON) 7.5 MG tablet, Take 1 tablet by mouth nightly, Disp: 30 tablet, Rfl: 5    simvastatin (ZOCOR) 10 MG tablet, Take 1 tablet by mouth nightly, Disp: 90 tablet, Rfl: Continue metoprolol. Monitor blood pressure. Relevant Medications    metoprolol tartrate (LOPRESSOR) 50 MG tablet       Endocrine    Type 2 diabetes mellitus with hyperglycemia, without long-term current use of insulin (HCC) - Primary     Discontinue insulin. Record blood pressures fasting and 6 PM.  Record back when they come in in 3 weeks. Relevant Orders    Comprehensive Metabolic Panel       Nervous and Auditory    Vascular dementia without behavioral disturbance ([facility]     Will discontinue. We will continue to monitor         Relevant Medications    mirtazapine (REMERON) 7.5 MG tablet       Genitourinary    Stage 3 chronic kidney disease (HCC)     Markedly improved from her CLAUDIA on admission. We will continue to monitor. Relevant Orders    Comprehensive Metabolic Panel    CBC Auto Differential    Magnesium       Other    Mixed hyperlipidemia     Continue simvastatin at bedtime along with co-Q10         Relevant Medications    simvastatin (ZOCOR) 10 MG tablet    metoprolol tartrate (LOPRESSOR) 50 MG tablet    Chronic gout without tophus     Hold allopurinol for now. Check uric acid later. Watch for any gout attacks         Iron deficiency anemia     Monitor CBC, check iron studies         Relevant Orders    CBC Auto Differential    IRON AND TIBC    POCT Fecal Immunochemical Test (FIT)           Return in about 3 weeks (around 7/7/2020).        Ester Wells, DO  6/16/2020

## 2020-06-19 ENCOUNTER — TELEPHONE (OUTPATIENT)
Dept: PRIMARY CARE CLINIC | Age: 80
End: 2020-06-19

## 2020-06-19 RX ORDER — GLIMEPIRIDE 2 MG/1
2 TABLET ORAL
Qty: 30 TABLET | Refills: 0 | Status: CANCELLED | OUTPATIENT
Start: 2020-06-19 | End: 2020-07-19

## 2020-06-19 RX ORDER — GLIMEPIRIDE 2 MG/1
2 TABLET ORAL
COMMUNITY
End: 2020-07-30

## 2020-06-19 NOTE — TELEPHONE ENCOUNTER
Have him come in today and show him how to use an insulin syringe if possible. Then he can start her on 10 units of Lantus before breakfast and monitor fasting and 2 hours after dinner.  If unable to do so this weekend he may restart  one pill of glimeperide with breakfast and let me know readings on Monday

## 2020-06-19 NOTE — TELEPHONE ENCOUNTER
Last Appointment:  6/16/2020  Future Appointments   Date Time Provider Chelsea Fultoni   7/8/2020  2:45 PM Yahir Aguilar DO [facility]       called he reports patient's BS yesterday morning fasting was 122. Two hours after breakfast it was 350. Asking if you want her go to back on insulin. Also, he reports his daughter had eye surgery and patient needs a shot there is not one to give the shot. He has never done it. He asked if he came into office if we could show him. Please advise.     Electronically signed by Hendrick Krabbe, LPN on 8/23/5928 at 4:78 AM

## 2020-06-22 LAB
A/G RATIO: 1.4 RATIO (ref 1.1–2.2)
ALBUMIN SERPL-MCNC: 3.9 G/DL (ref 3.4–4.8)
ALP BLD-CCNC: 123 U/L (ref 42–121)
ALT SERPL-CCNC: 35 U/L (ref 10–54)
ANION GAP SERPL CALCULATED.3IONS-SCNC: 7 MEQ/L (ref 3–11)
AST SERPL-CCNC: 43 U/L (ref 10–41)
BASOPHILS ABSOLUTE: 0 K/UL (ref 0–0.2)
BASOPHILS RELATIVE PERCENT: 0.6 % (ref 0–1.5)
BILIRUB SERPL-MCNC: 0.4 MG/DL (ref 0.3–1.5)
BUN BLDV-MCNC: 19 MG/DL (ref 8–21)
CALCIUM SERPL-MCNC: 9.1 MG/DL (ref 8.5–10.5)
CHLORIDE BLD-SCNC: 107 MEQ/L (ref 98–107)
CO2: 23 MEQ/L (ref 21–31)
CREAT SERPL-MCNC: 1.1 MG/DL (ref 0.4–1)
CREATININE + EGFR PANEL: 58 ML/MIN
DIFFERENTIAL, MANUAL: NO
EOSINOPHILS ABSOLUTE: 0.3 K/UL (ref 0–0.33)
EOSINOPHILS RELATIVE PERCENT: 3.9 % (ref 0–3)
GFR NON-AFRICAN AMERICAN: 48 ML/MIN
GLOBULIN: 2.7 G/DL (ref 1.9–3.9)
GLUCOSE BLD-MCNC: 161 MG/DL (ref 70–99)
HCT VFR BLD CALC: 27.1 % (ref 36–44)
HEMOGLOBIN: 9.1 G/DL (ref 12–15)
IRON SATURATION: 27 % (ref 15–55)
IRON: 107 UG/DL (ref 50–170)
LYMPHOCYTES ABSOLUTE: 1.3 K/UL (ref 1.1–4.8)
LYMPHOCYTES RELATIVE PERCENT: 20.6 % (ref 24–44)
MAGNESIUM: 2 MEQ/L (ref 1.6–2.6)
MCH RBC QN AUTO: 34.2 PG (ref 28–34)
MCHC RBC AUTO-ENTMCNC: 33.5 G/DL (ref 33–37)
MCV RBC AUTO: 102 FL (ref 80–100)
MONOCYTES ABSOLUTE: 0.5 K/UL (ref 0.2–0.7)
MONOCYTES RELATIVE PERCENT: 7.1 % (ref 3.4–9)
NEUTROPHILS ABSOLUTE: 4.4 K/UL (ref 1.83–8.7)
PDW BLD-RTO: 16.1 % (ref 10.9–14.3)
PLATELET # BLD: 366 K/UL (ref 150–450)
PMV BLD AUTO: 8.1 FL (ref 7.4–10.4)
POTASSIUM SERPL-SCNC: 5.2 MEQ/L (ref 3.6–5)
RBC # BLD: 2.66 M/UL (ref 4–4.9)
SEGMENTED NEUTROPHILS RELATIVE PERCENT: 67.8 % (ref 40–74)
SODIUM BLD-SCNC: 137 MEQ/L (ref 135–145)
TOTAL IRON BINDING CAPACITY: 392 UG/DL (ref 250–450)
TOTAL PROTEIN: 6.6 G/DL (ref 5.9–7.8)
TRANSFERRIN: 280 MG/DL (ref 192–382)
WBC # BLD: 6.5 K/UL (ref 4.5–11)

## 2020-06-25 ENCOUNTER — HOSPITAL ENCOUNTER (OUTPATIENT)
Age: 80
Discharge: HOME | End: 2020-06-27
Payer: MEDICARE

## 2020-06-25 PROBLEM — R30.0 DYSURIA: Status: ACTIVE | Noted: 2020-06-25

## 2020-06-25 PROCEDURE — 87088 URINE BACTERIA CULTURE: CPT

## 2020-06-25 PROCEDURE — 81002 URINALYSIS NONAUTO W/O SCOPE: CPT | Performed by: INTERNAL MEDICINE

## 2020-06-28 LAB
A/G RATIO: 1.4 RATIO (ref 1.1–2.2)
ALBUMIN SERPL-MCNC: 3.4 G/DL (ref 3.4–4.8)
ALP BLD-CCNC: 117 U/L (ref 42–121)
ALT SERPL-CCNC: 27 U/L (ref 10–54)
ANION GAP SERPL CALCULATED.3IONS-SCNC: 8 MEQ/L (ref 3–11)
AST SERPL-CCNC: 39 U/L (ref 10–41)
BASOPHILS ABSOLUTE: 0 K/UL (ref 0–0.2)
BASOPHILS RELATIVE PERCENT: 0.8 % (ref 0–1.5)
BILIRUB SERPL-MCNC: 0.6 MG/DL (ref 0.3–1.5)
BUN BLDV-MCNC: 19 MG/DL (ref 8–21)
CALCIUM SERPL-MCNC: 8.7 MG/DL (ref 8.5–10.5)
CHLORIDE BLD-SCNC: 107 MEQ/L (ref 98–107)
CO2: 22 MEQ/L (ref 21–31)
CREAT SERPL-MCNC: 1.1 MG/DL (ref 0.4–1)
CREATININE + EGFR PANEL: 58 ML/MIN
DIFFERENTIAL, MANUAL: NO
EOSINOPHILS ABSOLUTE: 0.2 K/UL (ref 0–0.33)
EOSINOPHILS RELATIVE PERCENT: 3.9 % (ref 0–3)
GFR NON-AFRICAN AMERICAN: 48 ML/MIN
GLOBULIN: 2.5 G/DL (ref 1.9–3.9)
GLUCOSE BLD-MCNC: 190 MG/DL (ref 70–99)
HCT VFR BLD CALC: 26.5 % (ref 36–44)
HEMOGLOBIN: 9 G/DL (ref 12–15)
LYMPHOCYTES ABSOLUTE: 1.4 K/UL (ref 1.1–4.8)
LYMPHOCYTES RELATIVE PERCENT: 23.2 % (ref 24–44)
MAGNESIUM: 1.8 MEQ/L (ref 1.6–2.6)
MCH RBC QN AUTO: 34.7 PG (ref 28–34)
MCHC RBC AUTO-ENTMCNC: 34.1 G/DL (ref 33–37)
MCV RBC AUTO: 101.6 FL (ref 80–100)
MONOCYTES ABSOLUTE: 0.5 K/UL (ref 0.2–0.7)
MONOCYTES RELATIVE PERCENT: 8.1 % (ref 3.4–9)
NEUTROPHILS ABSOLUTE: 3.7 K/UL (ref 1.83–8.7)
PDW BLD-RTO: 16 % (ref 10.9–14.3)
PLATELET # BLD: 342 K/UL (ref 150–450)
PMV BLD AUTO: 7.8 FL (ref 7.4–10.4)
POTASSIUM SERPL-SCNC: 4.6 MEQ/L (ref 3.6–5)
RBC # BLD: 2.6 M/UL (ref 4–4.9)
SEGMENTED NEUTROPHILS RELATIVE PERCENT: 64 % (ref 40–74)
SODIUM BLD-SCNC: 137 MEQ/L (ref 135–145)
TOTAL PROTEIN: 5.9 G/DL (ref 5.9–7.8)
URINE CULTURE, ROUTINE: NORMAL
WBC # BLD: 5.8 K/UL (ref 4.5–11)

## 2020-06-29 ENCOUNTER — TELEPHONE (OUTPATIENT)
Dept: PRIMARY CARE CLINIC | Age: 80
End: 2020-06-29

## 2020-06-29 RX ORDER — INSULIN GLARGINE 100 [IU]/ML
14 INJECTION, SOLUTION SUBCUTANEOUS NIGHTLY
Qty: 1 VIAL | Refills: 5 | Status: SHIPPED | OUTPATIENT
Start: 2020-06-29

## 2020-07-04 PROBLEM — R05.9 COUGH: Status: RESOLVED | Noted: 2020-06-04 | Resolved: 2020-07-04

## 2020-07-06 LAB
A/G RATIO: 1.3 RATIO (ref 1.1–2.2)
ALBUMIN SERPL-MCNC: 3.7 G/DL (ref 3.4–4.8)
ALP BLD-CCNC: 107 U/L (ref 42–121)
ALT SERPL-CCNC: 24 U/L (ref 10–54)
ANION GAP SERPL CALCULATED.3IONS-SCNC: 9 MEQ/L (ref 3–11)
AST SERPL-CCNC: 49 U/L (ref 10–41)
BASOPHILS ABSOLUTE: 0.1 K/UL (ref 0–0.2)
BASOPHILS RELATIVE PERCENT: 1.5 % (ref 0–1.5)
BILIRUB SERPL-MCNC: 0.7 MG/DL (ref 0.3–1.5)
BUN BLDV-MCNC: 14 MG/DL (ref 8–21)
CALCIUM SERPL-MCNC: 9.4 MG/DL (ref 8.5–10.5)
CHLORIDE BLD-SCNC: 109 MEQ/L (ref 98–107)
CO2: 21 MEQ/L (ref 21–31)
CREAT SERPL-MCNC: 1.1 MG/DL (ref 0.4–1)
CREATININE + EGFR PANEL: 58 ML/MIN
DIFFERENTIAL, MANUAL: NO
EOSINOPHILS ABSOLUTE: 0.3 K/UL (ref 0–0.33)
EOSINOPHILS RELATIVE PERCENT: 3.1 % (ref 0–3)
GFR NON-AFRICAN AMERICAN: 48 ML/MIN
GLOBULIN: 2.8 G/DL (ref 1.9–3.9)
GLUCOSE BLD-MCNC: 159 MG/DL (ref 70–99)
HCT VFR BLD CALC: 31.1 % (ref 36–44)
HEMOGLOBIN: 10.2 G/DL (ref 12–15)
LYMPHOCYTES ABSOLUTE: 2 K/UL (ref 1.1–4.8)
LYMPHOCYTES RELATIVE PERCENT: 22.3 % (ref 24–44)
MAGNESIUM: 1.5 MEQ/L (ref 1.6–2.6)
MCH RBC QN AUTO: 33.2 PG (ref 28–34)
MCHC RBC AUTO-ENTMCNC: 32.8 G/DL (ref 33–37)
MCV RBC AUTO: 101.2 FL (ref 80–100)
MONOCYTES ABSOLUTE: 0.6 K/UL (ref 0.2–0.7)
MONOCYTES RELATIVE PERCENT: 7.2 % (ref 3.4–9)
NEUTROPHILS ABSOLUTE: 5.9 K/UL (ref 1.83–8.7)
PDW BLD-RTO: 15.3 % (ref 10.9–14.3)
PLATELET # BLD: 282 K/UL (ref 150–450)
PMV BLD AUTO: 9.2 FL (ref 7.4–10.4)
POTASSIUM SERPL-SCNC: 4.8 MEQ/L (ref 3.6–5)
RBC # BLD: 3.07 M/UL (ref 4–4.9)
SEGMENTED NEUTROPHILS RELATIVE PERCENT: 65.9 % (ref 40–74)
SODIUM BLD-SCNC: 139 MEQ/L (ref 135–145)
TOTAL PROTEIN: 6.5 G/DL (ref 5.9–7.8)
WBC # BLD: 8.9 K/UL (ref 4.5–11)

## 2020-07-07 ENCOUNTER — TELEPHONE (OUTPATIENT)
Dept: PRIMARY CARE CLINIC | Age: 80
End: 2020-07-07

## 2020-07-07 PROBLEM — H91.13 PRESBYCUSIS OF BOTH EARS: Status: ACTIVE | Noted: 2020-07-07

## 2020-07-08 ENCOUNTER — OFFICE VISIT (OUTPATIENT)
Dept: PRIMARY CARE CLINIC | Age: 80
End: 2020-07-08
Payer: MEDICARE

## 2020-07-08 VITALS
TEMPERATURE: 98.2 F | BODY MASS INDEX: 22.67 KG/M2 | HEIGHT: 58 IN | OXYGEN SATURATION: 97 % | HEART RATE: 66 BPM | DIASTOLIC BLOOD PRESSURE: 64 MMHG | WEIGHT: 108 LBS | SYSTOLIC BLOOD PRESSURE: 122 MMHG

## 2020-07-08 PROBLEM — Z79.4 TYPE 2 DIABETES MELLITUS WITH HYPERGLYCEMIA, WITH LONG-TERM CURRENT USE OF INSULIN (HCC): Status: ACTIVE | Noted: 2020-07-08

## 2020-07-08 PROBLEM — E11.65 TYPE 2 DIABETES MELLITUS WITH HYPERGLYCEMIA, WITH LONG-TERM CURRENT USE OF INSULIN (HCC): Status: ACTIVE | Noted: 2020-07-08

## 2020-07-08 PROCEDURE — G8420 CALC BMI NORM PARAMETERS: HCPCS | Performed by: INTERNAL MEDICINE

## 2020-07-08 PROCEDURE — 1090F PRES/ABSN URINE INCON ASSESS: CPT | Performed by: INTERNAL MEDICINE

## 2020-07-08 PROCEDURE — G8427 DOCREV CUR MEDS BY ELIG CLIN: HCPCS | Performed by: INTERNAL MEDICINE

## 2020-07-08 PROCEDURE — 1036F TOBACCO NON-USER: CPT | Performed by: INTERNAL MEDICINE

## 2020-07-08 PROCEDURE — 1123F ACP DISCUSS/DSCN MKR DOCD: CPT | Performed by: INTERNAL MEDICINE

## 2020-07-08 PROCEDURE — G8399 PT W/DXA RESULTS DOCUMENT: HCPCS | Performed by: INTERNAL MEDICINE

## 2020-07-08 PROCEDURE — 4040F PNEUMOC VAC/ADMIN/RCVD: CPT | Performed by: INTERNAL MEDICINE

## 2020-07-08 PROCEDURE — 99214 OFFICE O/P EST MOD 30 MIN: CPT | Performed by: INTERNAL MEDICINE

## 2020-07-08 ASSESSMENT — ENCOUNTER SYMPTOMS
RHINORRHEA: 0
FACIAL SWELLING: 0
COLOR CHANGE: 0
TROUBLE SWALLOWING: 0
COUGH: 0
SHORTNESS OF BREATH: 0
EYE DISCHARGE: 0
ANAL BLEEDING: 0
STRIDOR: 0
EYE PAIN: 0
SORE THROAT: 0
BLOOD IN STOOL: 0
VOMITING: 0
NAUSEA: 0
EYE ITCHING: 0
WHEEZING: 0
ABDOMINAL PAIN: 0
DIARRHEA: 0
PHOTOPHOBIA: 0
CONSTIPATION: 0

## 2020-07-08 NOTE — PROGRESS NOTES
2020    Name: Suad Godwin : 1940 Sex: female  Age: 79 y.o. Subjective:  Chief Complaint   Patient presents with    Diabetes        HPI   Patient was hospitalized at [facility] with increased lethargy, loss of appetite and weakness. She was found to be in acute renal failure with a BUN of 173, creatinine of over 9. She was acidotic. She was anemic. She was treated with IV bicarbonate, IV fluids, urine culture grew out E. coli. She was treated with IV Rocephin. She was seen by infectious disease, nephrology. She gradually improved. And was discharged to home on 2020. Her potassium was 3.4, chloride 112, BUN 35, creatinine 1.4 CO2 was normal anion gap was normal calcium was decreased at 7.7 but I do not have a albumin level on this set of blood work. Hemoglobin is 8.5 with hematocrit 25.7. MCV was slightly elevated. We have been monitoring her blood work as an outpatient and her creatinine is down to 1.1. Her hemoglobin is up to 10.5. We will continue monitoring it weekly x1 and then decide what to do after that. Her  states that about 4 weeks prior to this incident she complained of black tarry stools. No abdominal pain. No history of gastritis or peptic ulcer disease    Medications reconciled  She was switched to Lantus in the hospital but according to family she is getting less than 10 units at bedtime. Her blood sugars were about 1 30-1 50 fasting and 2 hours postprandial they are in the 180 range. Her Lantus was increased to 14 units and given at bedtime and her blood sugars have been much better reviewed records. .. All her oral diabetes medications were discontinued. Donepezil was discontinued. Allopurinol ,enalapril and hydrochlorothiazide were discontinued.   She remained on simvastatin 10 mg at bedtime mirtazapine 7.5 mg at bedtime metoprolol tartrate 75 mg twice daily a probiotic daily Mag-Ox 400 twice daily as her last magnesium level mg by mouth every morning (before breakfast), Disp: , Rfl:     Saccharomyces boulardii (PROBIOTIC) 250 MG CAPS, TAKE ONE CAPSULE BY MOUTH TWO TIMES A DAY WHILE ON ANTIBIOTICS, Disp: , Rfl:     magnesium oxide (MAG-OX) 400 (241.3 Mg) MG TABS tablet, TAKE ONE TABLET BY MOUTH TWO TIMES A DAY, Disp: , Rfl:     metoprolol tartrate (LOPRESSOR) 50 MG tablet, Take 1.5 tablets by mouth 2 times daily, Disp: 270 tablet, Rfl: 3    mirtazapine (REMERON) 7.5 MG tablet, Take 1 tablet by mouth nightly, Disp: 30 tablet, Rfl: 5    simvastatin (ZOCOR) 10 MG tablet, Take 1 tablet by mouth nightly, Disp: 90 tablet, Rfl: 3    coenzyme Q10 100 MG CAPS capsule, Take 100 mg by mouth daily, Disp: , Rfl:     Cholecalciferol (VITAMIN D3) 50 MCG ( UT) CAPS, Take by mouth daily , Disp: , Rfl:      Allergies   Allergen Reactions    Penicillins         Past Medical History:   Diagnosis Date    Essential hypertension 2017    Mixed hyperlipidemia 2019    Vascular dementia without behavioral disturbance ([facility] [address] 2019       Health Maintenance Due   Topic Date Due    DTaP/Tdap/Td vaccine (1 - Tdap) 1960    Annual Wellness Visit (AWV)  2019        Patient Active Problem List   Diagnosis    Essential hypertension    Mixed hyperlipidemia    Vascular dementia without behavioral disturbance ([facility] [address]    Chronic gout without tophus    Vitamin D deficiency    Iron deficiency anemia    Stage 3 chronic kidney disease ([facility] [address]    Dysuria    Presbycusis of both ears    Type 2 diabetes mellitus with hyperglycemia, with long-term current use of insulin ([facility])        Past Surgical History:   Procedure Laterality Date     SECTION      DILATION AND CURETTAGE OF UTERUS      TONSILLECTOMY          Family History   Problem Relation Age of Onset    Heart Attack Father         Social History     Tobacco Use    Smoking status: Never Smoker    Smokeless tobacco: Never Used   Substance Use Topics    Alcohol use: Not Currently     Frequency: Never    Drug use: Never        Objective  Vitals:    07/08/20 1444   BP: 122/64   Site: Right Upper Arm   Position: Sitting   Cuff Size: Medium Adult   Pulse: 66   Temp: 98.2 °F (36.8 °C)   TempSrc: Temporal   SpO2: 97%   Weight: 108 lb (49 kg)   Height: 4' 10\" (1.473 m)        Exam:  Physical Exam  Vitals signs reviewed. Exam conducted with a chaperone present. Constitutional:       General: She is not in acute distress. Appearance: Normal appearance. She is well-developed. She is not ill-appearing. HENT:      Head: Normocephalic. Right Ear: External ear normal.      Left Ear: External ear normal.   Eyes:      General: No scleral icterus. Right eye: No discharge. Left eye: No discharge. Conjunctiva/sclera: Conjunctivae normal.      Pupils: Pupils are equal, round, and reactive to light. Neck:      Musculoskeletal: Normal range of motion and neck supple. Thyroid: No thyromegaly. Cardiovascular:      Rate and Rhythm: Normal rate and regular rhythm. Heart sounds: Normal heart sounds. No murmur. No friction rub. No gallop. Pulmonary:      Effort: Pulmonary effort is normal. No respiratory distress. Breath sounds: Normal breath sounds. No wheezing or rales. Chest:      Chest wall: No tenderness. Abdominal:      General: Bowel sounds are normal. There is no distension. Palpations: Abdomen is soft. There is no mass. Tenderness: There is no abdominal tenderness. There is no guarding or rebound. Musculoskeletal: Normal range of motion. General: No tenderness or deformity. Lymphadenopathy:      Cervical: No cervical adenopathy. Skin:     General: Skin is warm and dry. Coloration: Skin is not pale. Findings: No erythema or rash. Neurological:      Mental Status: She is alert and oriented to person, place, and time. Cranial Nerves: No cranial nerve deficit. Sensory: No sensory deficit.       Deep Tendon Reflexes: Reflexes normal.   Psychiatric:         Mood and Affect: Mood normal.         Behavior: Behavior normal.         Thought Content: Thought content normal.         Judgment: Judgment normal.          Last labs reviewed. ASSESSMENT & PLAN :   Problem List        Circulatory    Essential hypertension - Primary     Continue her metoprolol tartrate 75 mg twice daily         Relevant Medications    metoprolol tartrate (LOPRESSOR) 50 MG tablet       Endocrine    Type 2 diabetes mellitus with hyperglycemia, with long-term current use of insulin (HCC)     Continue Lantus 40 units at bedtime and glimepiride 2 mg before breakfast.  Continue monitoring her blood sugars daily fasting         Relevant Medications    glimepiride (AMARYL) 2 MG tablet    insulin glargine (LANTUS) 100 UNIT/ML injection vial       Nervous and Auditory    Vascular dementia without behavioral disturbance (HCC)     Follow-up with Dr. Ellen Ventura. Continue her Remeron for now         Relevant Medications    mirtazapine (REMERON) 7.5 MG tablet       Genitourinary    Stage 3 chronic kidney disease (HCC)     Avoid NSAID use and will continue to monitor. She is almost back to baseline            Other    Mixed hyperlipidemia     Watch saturated fats in diet and will monitor lipids, Continue simvastatin 10 mg at bedtime         Relevant Medications    simvastatin (ZOCOR) 10 MG tablet    metoprolol tartrate (LOPRESSOR) 50 MG tablet    Vitamin D deficiency     Continue vitamin D3  2000 units daily                Return in about 6 weeks (around 8/19/2020) for AWV and office visit, be fasting.        Jose Crew, DO  7/8/2020

## 2020-07-08 NOTE — ASSESSMENT & PLAN NOTE
Continue Lantus 40 units at bedtime and glimepiride 2 mg before breakfast.  Continue monitoring her blood sugars daily fasting

## 2020-07-20 ENCOUNTER — TELEPHONE (OUTPATIENT)
Dept: PRIMARY CARE CLINIC | Age: 80
End: 2020-07-20

## 2020-07-20 LAB
A/G RATIO: 1.2 RATIO (ref 1.1–2.2)
ALBUMIN SERPL-MCNC: 3.5 G/DL (ref 3.4–4.8)
ALP BLD-CCNC: 97 U/L (ref 42–121)
ALT SERPL-CCNC: 23 U/L (ref 10–54)
ANION GAP SERPL CALCULATED.3IONS-SCNC: 8 MEQ/L (ref 3–11)
AST SERPL-CCNC: 48 U/L (ref 10–41)
BASOPHILS ABSOLUTE: 0.1 K/UL (ref 0–0.2)
BASOPHILS RELATIVE PERCENT: 0.6 % (ref 0–1.5)
BILIRUB SERPL-MCNC: 0.6 MG/DL (ref 0.3–1.5)
BUN BLDV-MCNC: 18 MG/DL (ref 8–21)
CALCIUM SERPL-MCNC: 8.9 MG/DL (ref 8.5–10.5)
CHLORIDE BLD-SCNC: 105 MEQ/L (ref 98–107)
CHOLESTEROL: 146 MG/DL (ref 140–200)
CO2: 22 MEQ/L (ref 21–31)
CREAT SERPL-MCNC: 0.9 MG/DL (ref 0.4–1)
CREATININE + EGFR PANEL: 73 ML/MIN
EOSINOPHILS ABSOLUTE: 0.3 K/UL (ref 0–0.33)
EOSINOPHILS RELATIVE PERCENT: 3.6 % (ref 0–3)
GFR NON-AFRICAN AMERICAN: 60 ML/MIN
GLOBULIN: 2.9 G/DL (ref 1.9–3.9)
GLUCOSE BLD-MCNC: 216 MG/DL (ref 70–99)
HCT VFR BLD CALC: 31.4 % (ref 36–44)
HDLC SERPL-MCNC: 37 MG/DL (ref 35–85)
HEMOGLOBIN: 10.6 G/DL (ref 12–15)
LDL CHOLESTEROL: 102 MG/DL
LDL/HDL RATIO: 2.8 RATIO
LYMPHOCYTES ABSOLUTE: 1.7 K/UL (ref 1.1–4.8)
LYMPHOCYTES RELATIVE PERCENT: 19.2 % (ref 24–44)
MCH RBC QN AUTO: 33.9 PG (ref 28–34)
MCHC RBC AUTO-ENTMCNC: 33.9 G/DL (ref 33–37)
MCV RBC AUTO: 100 FL (ref 80–100)
MONOCYTES ABSOLUTE: 0.6 K/UL (ref 0.2–0.7)
MONOCYTES RELATIVE PERCENT: 6.1 % (ref 3.4–9)
NEUTROPHILS ABSOLUTE: 6.4 K/UL (ref 1.83–8.7)
PDW BLD-RTO: 14.6 % (ref 10.9–14.3)
PLATELET # BLD: 346 K/UL (ref 150–450)
PMV BLD AUTO: 8.4 FL (ref 7.4–10.4)
POTASSIUM SERPL-SCNC: 4.2 MEQ/L (ref 3.6–5)
RBC # BLD: 3.14 M/UL (ref 4–4.9)
SEGMENTED NEUTROPHILS RELATIVE PERCENT: 70.5 % (ref 40–74)
SODIUM BLD-SCNC: 135 MEQ/L (ref 135–145)
TOTAL PROTEIN: 6.4 G/DL (ref 5.9–7.8)
TRIGL SERPL-MCNC: 197 MG/DL (ref 41–189)
WBC # BLD: 9 K/UL (ref 4.5–11)

## 2020-07-20 NOTE — TELEPHONE ENCOUNTER
Pt  called in and states the visitng nurse is to come today at 10:30 and doesn't have an order for blood work, was you wanting any if so put the order in and ill fax it over if not ill call the  back

## 2020-07-21 NOTE — TELEPHONE ENCOUNTER
Last Appointment:  7/8/2020  Future Appointments   Date Time Provider Chelsea Marie   8/25/2020 11:00 AM [address] [facility] . [address] [facility]      Patient needs pended med refilled.     Electronically signed by Eric Pnea LPN on 3/66/4911 at 98:87 AM

## 2020-07-30 ENCOUNTER — TELEPHONE (OUTPATIENT)
Dept: PRIMARY CARE CLINIC | Age: 80
End: 2020-07-30

## 2020-07-30 RX ORDER — GLIMEPIRIDE 2 MG/1
2 TABLET ORAL
Qty: 60 TABLET | Refills: 5 | Status: SHIPPED
Start: 2020-07-30 | End: 2020-08-25

## 2020-07-30 NOTE — TELEPHONE ENCOUNTER
I called Mr. Anthony about Celeste's blood sugars. The fasting bs are still quite high,  1601 was the highest, lowest was 138. She is on 14 units of Lantus at bedtime and glimepiride 2 mg with breakfast.  2 hours after breakfast her blood sugars in the 200 range. We can increase her Lantus to 18 units at bedtime and increase her glimepiride to 2 mg twice daily with meals. He is to check her blood sugars the same way has been doing for couple of weeks and call them in.

## 2020-07-31 ENCOUNTER — TELEPHONE (OUTPATIENT)
Dept: PRIMARY CARE CLINIC | Age: 80
End: 2020-07-31

## 2020-07-31 NOTE — TELEPHONE ENCOUNTER
Pt  called in and said her fasting blood sugar was 80 this morning and he is concerned that its to low with the medications that she had adjusted yesterday.

## 2020-08-25 ENCOUNTER — HOSPITAL ENCOUNTER (OUTPATIENT)
Age: 80
Discharge: HOME | End: 2020-08-27
Payer: MEDICARE

## 2020-08-25 ENCOUNTER — OFFICE VISIT (OUTPATIENT)
Dept: PRIMARY CARE CLINIC | Age: 80
End: 2020-08-25
Payer: MEDICARE

## 2020-08-25 VITALS
BODY MASS INDEX: 22.38 KG/M2 | SYSTOLIC BLOOD PRESSURE: 110 MMHG | OXYGEN SATURATION: 96 % | RESPIRATION RATE: 14 BRPM | TEMPERATURE: 97.6 F | HEIGHT: 59 IN | WEIGHT: 111 LBS | HEART RATE: 46 BPM | DIASTOLIC BLOOD PRESSURE: 70 MMHG

## 2020-08-25 PROBLEM — E83.42 HYPOMAGNESEMIA: Status: ACTIVE | Noted: 2020-08-25

## 2020-08-25 PROBLEM — E78.1 HYPERTRIGLYCERIDEMIA: Status: ACTIVE | Noted: 2020-08-25

## 2020-08-25 PROBLEM — N18.30 STAGE 3 CHRONIC KIDNEY DISEASE (HCC): Status: RESOLVED | Noted: 2020-06-16 | Resolved: 2020-08-25

## 2020-08-25 LAB
BASOPHILS ABSOLUTE: 0.06 E9/L (ref 0–0.2)
BASOPHILS RELATIVE PERCENT: 0.6 % (ref 0–2)
EOSINOPHILS ABSOLUTE: 0.34 E9/L (ref 0.05–0.5)
EOSINOPHILS RELATIVE PERCENT: 3.7 % (ref 0–6)
HCT VFR BLD CALC: 36.1 % (ref 34–48)
HEMOGLOBIN: 11.1 G/DL (ref 11.5–15.5)
IMMATURE GRANULOCYTES #: 0.03 E9/L
IMMATURE GRANULOCYTES %: 0.3 % (ref 0–5)
LYMPHOCYTES ABSOLUTE: 2.3 E9/L (ref 1.5–4)
LYMPHOCYTES RELATIVE PERCENT: 24.8 % (ref 20–42)
MAGNESIUM: 2.6 MG/DL (ref 1.6–2.6)
MCH RBC QN AUTO: 32.4 PG (ref 26–35)
MCHC RBC AUTO-ENTMCNC: 30.7 % (ref 32–34.5)
MCV RBC AUTO: 105.2 FL (ref 80–99.9)
MONOCYTES ABSOLUTE: 0.64 E9/L (ref 0.1–0.95)
MONOCYTES RELATIVE PERCENT: 6.9 % (ref 2–12)
NEUTROPHILS ABSOLUTE: 5.92 E9/L (ref 1.8–7.3)
NEUTROPHILS RELATIVE PERCENT: 63.7 % (ref 43–80)
PDW BLD-RTO: 13.4 FL (ref 11.5–15)
PLATELET # BLD: 312 E9/L (ref 130–450)
PMV BLD AUTO: 10.3 FL (ref 7–12)
RBC # BLD: 3.43 E12/L (ref 3.5–5.5)
WBC # BLD: 9.3 E9/L (ref 4.5–11.5)

## 2020-08-25 PROCEDURE — G8399 PT W/DXA RESULTS DOCUMENT: HCPCS | Performed by: INTERNAL MEDICINE

## 2020-08-25 PROCEDURE — 36415 COLL VENOUS BLD VENIPUNCTURE: CPT

## 2020-08-25 PROCEDURE — 1123F ACP DISCUSS/DSCN MKR DOCD: CPT | Performed by: INTERNAL MEDICINE

## 2020-08-25 PROCEDURE — 83735 ASSAY OF MAGNESIUM: CPT

## 2020-08-25 PROCEDURE — G8420 CALC BMI NORM PARAMETERS: HCPCS | Performed by: INTERNAL MEDICINE

## 2020-08-25 PROCEDURE — 1036F TOBACCO NON-USER: CPT | Performed by: INTERNAL MEDICINE

## 2020-08-25 PROCEDURE — G8427 DOCREV CUR MEDS BY ELIG CLIN: HCPCS | Performed by: INTERNAL MEDICINE

## 2020-08-25 PROCEDURE — 4040F PNEUMOC VAC/ADMIN/RCVD: CPT | Performed by: INTERNAL MEDICINE

## 2020-08-25 PROCEDURE — 85025 COMPLETE CBC W/AUTO DIFF WBC: CPT

## 2020-08-25 PROCEDURE — 99213 OFFICE O/P EST LOW 20 MIN: CPT | Performed by: INTERNAL MEDICINE

## 2020-08-25 PROCEDURE — 1090F PRES/ABSN URINE INCON ASSESS: CPT | Performed by: INTERNAL MEDICINE

## 2020-08-25 RX ORDER — GLIMEPIRIDE 2 MG/1
2 TABLET ORAL
COMMUNITY

## 2020-08-25 RX ORDER — DONEPEZIL HYDROCHLORIDE 10 MG/1
TABLET, FILM COATED ORAL
COMMUNITY
Start: 2020-08-10 | End: 2020-08-25 | Stop reason: SINTOL

## 2020-08-25 RX ORDER — DONEPEZIL HYDROCHLORIDE 10 MG/1
5 TABLET, FILM COATED ORAL NIGHTLY
COMMUNITY

## 2020-08-25 ASSESSMENT — ENCOUNTER SYMPTOMS
ANAL BLEEDING: 0
EYE DISCHARGE: 0
TROUBLE SWALLOWING: 0
FACIAL SWELLING: 0
SHORTNESS OF BREATH: 0
EYE ITCHING: 0
PHOTOPHOBIA: 0
ABDOMINAL PAIN: 0
CONSTIPATION: 0
EYE PAIN: 0
DIARRHEA: 0
VOMITING: 0
COLOR CHANGE: 0
COUGH: 0
SORE THROAT: 0
WHEEZING: 0
RHINORRHEA: 0
STRIDOR: 0
BLOOD IN STOOL: 0
NAUSEA: 0

## 2020-08-25 NOTE — PROGRESS NOTES
2020    Name: Mir Gant : 1940 Sex: female  Age: 79 y.o. Subjective:  Chief Complaint   Patient presents with    Other     6 week follow up        HPI   Patient was hospitalized at [facility] with increased lethargy, loss of appetite and weakness. She was found to be in acute renal failure with a BUN of 173, creatinine of over 9. She was acidotic. She was anemic. She was treated with IV bicarbonate, IV fluids, urine culture grew out E. coli. She was treated with IV Rocephin. She was seen by infectious disease, nephrology. She gradually improved. And was discharged to home on 2020. Her potassium was 3.4, chloride 112, BUN 35, creatinine 1.4 CO2 was normal anion gap was normal calcium was decreased at 7.7 but I do not have a albumin level on this set of blood work. Hemoglobin is 8.5 with hematocrit 25.7. MCV was slightly elevated. We have been monitoring her blood work  and her hemoglobin is up to 10.6 with a hematocrit of 31.5. We need to monitor this. Magnesium was a little low in 2020 at 1.5. She is on Mag-Ox 400 twice daily and we will recheck this level. BUN and creatinine improved to 18 and 0.9 with an estimated GFR of 60. Her fasting blood sugar that was high at 216. I reviewed her blood sugar diary and there were a couple of fasting blood sugars of her below 100. I am not sure if this is the time when she was being given glimepiride 2 mg twice daily. After they adjusted that down to 2 mg with breakfast only it seems like her blood sugars have improved and they are less than 115. Last hemoglobin A1c was excellent at 6.8%. She continues on Lantus 1 4 units subcu at night. I wonder with her kidney functions back to normal whether we can just keep her on glimepiride and increase it to twice a day or even switch her to Glucotrol XL 10 mg once in the morning. We will discuss this with her on her next office visit.     \She saw Dr. Wolf Dubon who   glimepiride (AMARYL) 2 MG tablet, Take 2 mg by mouth every morning (before breakfast), Disp: , Rfl:     donepezil (ARICEPT) 10 MG tablet, Take 5 mg by mouth nightly, Disp: , Rfl:     magnesium oxide (MAG-OX) 400 (241.3 Mg) MG TABS tablet, TAKE ONE TABLET BY MOUTH TWO TIMES A DAY, Disp: 30 tablet, Rfl: 3    insulin glargine (LANTUS) 100 UNIT/ML injection vial, Inject 14 Units into the skin nightly, Disp: 1 vial, Rfl: 5    metoprolol tartrate (LOPRESSOR) 50 MG tablet, Take 1.5 tablets by mouth 2 times daily, Disp: 270 tablet, Rfl: 3    simvastatin (ZOCOR) 10 MG tablet, Take 1 tablet by mouth nightly, Disp: 90 tablet, Rfl: 3    coenzyme Q10 100 MG CAPS capsule, Take 100 mg by mouth daily, Disp: , Rfl:     Cholecalciferol (VITAMIN D3) 50 MCG ( UT) CAPS, Take by mouth daily , Disp: , Rfl:      Allergies   Allergen Reactions    Penicillins         Past Medical History:   Diagnosis Date    Essential hypertension 2017    Mixed hyperlipidemia 2019    Vascular dementia without behavioral disturbance ([facility] [address] 2019       Health Maintenance Due   Topic Date Due    DTaP/Tdap/Td vaccine (1 - Tdap) 1960    Annual Wellness Visit (AWV)  2019        Patient Active Problem List   Diagnosis    Essential hypertension    Mixed hyperlipidemia    Vascular dementia without behavioral disturbance (HCC)    Chronic gout without tophus    Vitamin D deficiency    Iron deficiency anemia    Dysuria    Presbycusis of both ears    Type 2 diabetes mellitus with hyperglycemia, with long-term current use of insulin (HCC)    Hypomagnesemia        Past Surgical History:   Procedure Laterality Date     SECTION      DILATION AND CURETTAGE OF UTERUS      TONSILLECTOMY          Family History   Problem Relation Age of Onset    Heart Attack Father         Social History     Tobacco Use    Smoking status: Never Smoker    Smokeless tobacco: Never Used   Substance Use Topics    Alcohol use: Not Currently     Frequency: Never    Drug use: Never        Objective  Vitals:    08/25/20 1104   BP: 110/70   Pulse: (!) 46   Resp: 14   Temp: 97.6 °F (36.4 °C)   SpO2: 96%   Weight: 111 lb (50.3 kg)   Height: 4' 10.75\" (1.492 m)        Exam:  Physical Exam  Vitals signs reviewed. Exam conducted with a chaperone present. Constitutional:       General: She is not in acute distress. Appearance: Normal appearance. She is well-developed. She is not ill-appearing. HENT:      Head: Normocephalic. Right Ear: External ear normal.      Left Ear: External ear normal.   Eyes:      General: No scleral icterus. Right eye: No discharge. Left eye: No discharge. Conjunctiva/sclera: Conjunctivae normal.      Pupils: Pupils are equal, round, and reactive to light. Neck:      Musculoskeletal: Normal range of motion and neck supple. Thyroid: No thyromegaly. Cardiovascular:      Rate and Rhythm: Normal rate and regular rhythm. Heart sounds: Normal heart sounds. No murmur. No friction rub. No gallop. Pulmonary:      Effort: Pulmonary effort is normal. No respiratory distress. Breath sounds: Normal breath sounds. No wheezing or rales. Chest:      Chest wall: No tenderness. Abdominal:      General: Bowel sounds are normal. There is no distension. Palpations: Abdomen is soft. There is no mass. Tenderness: There is no abdominal tenderness. There is no guarding or rebound. Musculoskeletal: Normal range of motion. General: No tenderness or deformity. Lymphadenopathy:      Cervical: No cervical adenopathy. Skin:     General: Skin is warm and dry. Coloration: Skin is not pale. Findings: No erythema or rash. Neurological:      Mental Status: She is alert and oriented to person, place, and time. Cranial Nerves: No cranial nerve deficit. Sensory: No sensory deficit.       Deep Tendon Reflexes: Reflexes normal.   Psychiatric:         Mood and Affect: Mood normal.         Behavior: Behavior normal.         Thought Content: Thought content normal.         Judgment: Judgment normal.          Last labs reviewed. ASSESSMENT & PLAN :   Problem List        Circulatory    Essential hypertension     Monitor blood pressures. See that there is a little low. Pulse is only 46 but she is asymptomatic. If her blood pressures are still this lowered her pulses still is low at her next office visit we will cut back her metoprolol tartrate 50 mg twice a day         Relevant Medications    metoprolol tartrate (LOPRESSOR) 50 MG tablet       Endocrine    Type 2 diabetes mellitus with hyperglycemia, with long-term current use of insulin (HCC) - Primary     Monitor fasting blood sugars only 3 times a week and record. Continue her Lantus 14 units subcu at bedtime and glimepiride 2 mg before breakfast.  If her A1c next time is good and her sugars are good we may discuss the possibility of discontinuing Lantus and switching her to Glucotrol XL instead of glimepiride because of less low blood sugar spells with this medication. Relevant Medications    insulin glargine (LANTUS) 100 UNIT/ML injection vial    glimepiride (AMARYL) 2 MG tablet       Nervous and Auditory    Vascular dementia without behavioral disturbance (HCC)     Continue lower dose donepezil and follow-up with neurology         Relevant Medications    donepezil (ARICEPT) 10 MG tablet       Other    Mixed hyperlipidemia     Watch saturated fats in diet and will monitor lipids. On next office visit. Continue her simvastatin 10 mg. We may opt to increase her simvastatin to 40 mg because of her history of ASCVD         Relevant Medications    simvastatin (ZOCOR) 10 MG tablet    metoprolol tartrate (LOPRESSOR) 50 MG tablet    Iron deficiency anemia     Patient's last iron level in July was good. She is no longer on iron.          Relevant Orders    CBC Auto Differential    Hypomagnesemia     Check

## 2020-08-25 NOTE — ASSESSMENT & PLAN NOTE
Check magnesium level, if within normal limits continue on Mag-Ox twice daily if low will increase it to 3 times daily

## 2020-08-25 NOTE — ASSESSMENT & PLAN NOTE
Monitor blood pressures. See that there is a little low. Pulse is only 46 but she is asymptomatic.   If her blood pressures are still this lowered her pulses still is low at her next office visit we will cut back her metoprolol tartrate 50 mg twice a day

## 2020-09-29 ENCOUNTER — TELEPHONE (OUTPATIENT)
Dept: PRIMARY CARE CLINIC | Age: 80
End: 2020-09-29

## 2020-09-29 NOTE — TELEPHONE ENCOUNTER
Last Appointment:  8/25/2020  Future Appointments   Date Time Provider Chelsea Hartisti   [facility]   12/1/2020 11:00 AM DO Loretta V[facility]       left message patient's BS reading since 09/17/2020 has been going down. Today it was 74. He is asking if he should give 12 units instead of the 14 units ordered.     Electronically signed by Kaden Islas LPN on 9/65/1231 at 9:73 AM

## 2020-09-29 NOTE — TELEPHONE ENCOUNTER
Okay to decrease insulin to 12 units. If over the next 4 days her blood sugars are still under 100 current down to 10 units.

## 2020-10-08 ENCOUNTER — TELEPHONE (OUTPATIENT)
Dept: PRIMARY CARE CLINIC | Age: 80
End: 2020-10-08

## 2020-10-19 NOTE — TELEPHONE ENCOUNTER
Dr Reyes at bedside update given Spoke with  informed him of decrease of insulin to 12 units. Informed him to record blood sugar results for 4 days if still below 100 to decrease to 10 units.  was able to verbalize understanding and repeat instructions.     Electronically signed by Trung Barcenas LPN on 1/93/3625 at 46:39 AM

## 2020-10-21 ENCOUNTER — TELEPHONE (OUTPATIENT)
Dept: PRIMARY CARE CLINIC | Age: 80
End: 2020-10-21

## 2020-10-21 NOTE — TELEPHONE ENCOUNTER
Call patient's . I reviewed the blood sugar diary over the last 4 days. It seems like her evening Lantus is good because her fasting blood sugars are very good.   She does have high blood sugars later in the day so I would recommend he keep the 8 units of Lantus at night and add 4 units of Lantus before breakfast.  Make sure she eats a good breakfast.  Have him monitor her blood sugars fasting and 2 hours after dinner and record them for the next 5 days and call them into me

## 2022-05-08 ENCOUNTER — HOSPITAL ENCOUNTER (EMERGENCY)
Age: 82
Discharge: HOME | End: 2022-05-08
Payer: MEDICARE

## 2022-05-08 ENCOUNTER — APPOINTMENT (OUTPATIENT)
Dept: GENERAL RADIOLOGY | Age: 82
End: 2022-05-08
Payer: MEDICARE

## 2022-05-08 VITALS
HEIGHT: 58 IN | SYSTOLIC BLOOD PRESSURE: 213 MMHG | RESPIRATION RATE: 16 BRPM | HEART RATE: 62 BPM | WEIGHT: 121 LBS | DIASTOLIC BLOOD PRESSURE: 87 MMHG | TEMPERATURE: 97.3 F | OXYGEN SATURATION: 98 % | BODY MASS INDEX: 25.4 KG/M2

## 2022-05-08 DIAGNOSIS — S61.411A LACERATION OF RIGHT HAND WITHOUT COMPLICATION, EXCLUDING FINGERS, INITIAL ENCOUNTER: Primary | ICD-10-CM

## 2022-05-08 PROCEDURE — 73130 X-RAY EXAM OF HAND: CPT

## 2022-05-08 PROCEDURE — 2500000003 HC RX 250 WO HCPCS: Performed by: PHYSICIAN ASSISTANT

## 2022-05-08 PROCEDURE — 99283 EMERGENCY DEPT VISIT LOW MDM: CPT

## 2022-05-08 PROCEDURE — 12002 RPR S/N/AX/GEN/TRNK2.6-7.5CM: CPT

## 2022-05-08 PROCEDURE — 6370000000 HC RX 637 (ALT 250 FOR IP): Performed by: PHYSICIAN ASSISTANT

## 2022-05-08 RX ORDER — GINSENG 100 MG
CAPSULE ORAL ONCE
Status: COMPLETED | OUTPATIENT
Start: 2022-05-08 | End: 2022-05-08

## 2022-05-08 RX ORDER — LIDOCAINE HYDROCHLORIDE 10 MG/ML
20 INJECTION, SOLUTION INFILTRATION; PERINEURAL ONCE
Status: COMPLETED | OUTPATIENT
Start: 2022-05-08 | End: 2022-05-08

## 2022-05-08 RX ADMIN — Medication: at 21:06

## 2022-05-08 RX ADMIN — LIDOCAINE HYDROCHLORIDE 20 ML: 10 INJECTION, SOLUTION INFILTRATION; PERINEURAL at 21:06

## 2022-05-08 NOTE — ED PROVIDER NOTES
[address] [place] [address]  Department of Emergency Medicine   ED  Encounter Note  Admit Date/RoomTime: 2022  7:33 PM  ED Room:     NAME: Linda Reyes  : 1940  MRN: [id number]     Chief Complaint:  Laceration (to right hand. fall)    History of Present Illness       Linda Reyes is a 81 y.o. old female presenting to the emergency department by private vehicle, for a laceration to the palm of the right hand, caused by door stop (metal with a plastic or rubber cap), which occurred at home approximately a few minute(s) prior to arrival.  There is not a possibility of retained foreign body in the affected area. Bleeding is  controlled. She takes no blood thinning agents. There is minimal pain at injury site. Pt denies hitting her head. She has no pain at injury site, she reports no other injury. Pt reports she stumbled because the shoes she was wearing slipped and there was box in front of her she kicked. Tetanus Status:  within past 5 years. ROS   Pertinent positives and negatives are stated within HPI, all other systems reviewed and are negative. Past Medical History:  has a past medical history of Essential hypertension, Mixed hyperlipidemia, and Vascular dementia without behavioral disturbance ([address] Surgical History:  has a past surgical history that includes  section; Dilation and curettage of uterus; and Tonsillectomy. Social History:  reports that she has never smoked. She has never used smokeless tobacco. She reports previous alcohol use. She reports that she does not use drugs. Family History: family history includes Heart Attack in her father.      Allergies: Penicillins    Physical Exam  Physical Exam   Oxygen Saturation Interpretation: Normal.        ED Triage Vitals [22]   BP Temp Temp Source Pulse Resp SpO2 Height Weight   (!) 213/87 97.3 °F (36.3 °C) Temporal 62 16 98 % 4' 10\" (1.473 m) 121 lb (54.9 kg) Constitutional:  Alert, development consistent with age. Neck:  Normal ROM. Supple. Non-tender. Hand: Right palm overlying the 2nd and 3rd metacarpals midrange            Tenderness: mild. Swelling: None. Deformity: no.             Skin: a laceration 3.5 cm total length curved shape flap       Neurovascular: Motor deficit: none. Sensory deficit: none. Pulse deficit: none. Capillary refill: normal.  Fingers: All fingers            Tenderness:  none. Swelling: None. Deformity: no.              ROM: full range of motion. Skin:  no wounds, erythema, or swelling. Wrist:  Right diffusely across carpal bones             Tenderness:  none. Swelling: None. Deformity: no.             ROM: full range of motion. Skin:  no wounds, erythema, or swelling. Lymphatics: No lymphangitis or adenopathy noted. Neurological:  Oriented. Motor functions intact. t. Lab / Imaging Results   (All laboratory and radiology results have been personally reviewed by myself)  Labs:  No results found for this visit on 05/08/22. Imaging: All Radiology results interpreted by Radiologist unless otherwise noted. XR HAND RIGHT (MIN 3 VIEWS)   Final Result   Subcutaneous air involving the soft tissues in the 1st and 2nd digits. Fractures. ED Course / Medical Decision Making     Medications   lidocaine 1 % injection 20 mL (20 mLs IntraDERmal Given by Other 5/8/22 2106)   bacitracin ointment ( Topical Given by Other 5/8/22 2106)        Consult(s):   None    Procedure(s):   PROCEDURE NOTE  5/8/22        LACERATION REPAIR  Risks, benefits and alternatives (for applicable procedures below) described. Performed By: Aaron Rapp PA-C. Informed consent: Verbal consent obtained.   The patient was and spouse was counseled regarding the procedure in person, it's indications, risks, potential complications and alternatives and any questions were answered. Verbal consent was obtained. Laceration #: 1. Location: right palm  Length: 3.5 cm. The wound area was irrigated with sterile saline and draped in a sterile fashion. Local Anesthesia:  obtained with Lidocaine 1% without epinephrine. The wound was explored with the following results: Thickness: full thickness. no foreign body or tendon injury seen. Debridement: None. Undermining: None. Wound Margins Revised: None. Flaps Aligned: yes. The wound was closed in single layer closure with #5  4-0 Ethilon using interrupted suture(s). Dressing:  bacitracin, a sterile dressing and gauze    There were no additional wounds requiring formal closure. MDM:   Imaging was obtained based on moderate suspicion for fracture / bony abnormalityas per history/physical findings. Wound was repaired in typical fashion, no complications. Xray negative for fracture. Wound care and suture removal time discussed with patient and spouse. Plan of Care/Counseling:  Zaida Johnson PA-C reviewed today's visit with the patient and spouse / life partner in addition to providing specific details for the plan of care and counseling regarding the diagnosis and prognosis. Questions are answered at this time and are agreeable with the plan. Assessment      1. Laceration of right hand without complication, excluding fingers, initial encounter      Plan   Discharged home. Patient condition is good    New Medications     Discharge Medication List as of 5/8/2022  8:49 PM        Electronically signed by Zaida Johnson PA-C   DD: 5/8/22  **This report was transcribed using voice recognition software. Every effort was made to ensure accuracy; however, inadvertent computerized transcription errors may be present.   END OF ED PROVIDER NOTE       Zaida Johnson PA-C  05/08/22 [address] [place] [address], [place]  05/08/22 4506

## 2022-11-30 NOTE — ASSESSMENT & PLAN NOTE
Monitor fasting blood sugars only 3 times a week and record. Continue her Lantus 14 units subcu at bedtime and glimepiride 2 mg before breakfast.  If her A1c next time is good and her sugars are good we may discuss the possibility of discontinuing Lantus and switching her to Glucotrol XL instead of glimepiride because of less low blood sugar spells with this medication. - - - - - -